# Patient Record
Sex: FEMALE | Race: WHITE | NOT HISPANIC OR LATINO
[De-identification: names, ages, dates, MRNs, and addresses within clinical notes are randomized per-mention and may not be internally consistent; named-entity substitution may affect disease eponyms.]

---

## 2018-11-26 ENCOUNTER — APPOINTMENT (OUTPATIENT)
Dept: INTERNAL MEDICINE | Facility: CLINIC | Age: 83
End: 2018-11-26
Payer: MEDICARE

## 2018-11-26 VITALS
WEIGHT: 162 LBS | HEIGHT: 64 IN | DIASTOLIC BLOOD PRESSURE: 72 MMHG | SYSTOLIC BLOOD PRESSURE: 138 MMHG | BODY MASS INDEX: 27.66 KG/M2

## 2018-11-26 DIAGNOSIS — Z86.79 PERSONAL HISTORY OF OTHER DISEASES OF THE CIRCULATORY SYSTEM: ICD-10-CM

## 2018-11-26 DIAGNOSIS — M85.80 OTHER SPECIFIED DISORDERS OF BONE DENSITY AND STRUCTURE, UNSPECIFIED SITE: ICD-10-CM

## 2018-11-26 DIAGNOSIS — Z78.9 OTHER SPECIFIED HEALTH STATUS: ICD-10-CM

## 2018-11-26 PROCEDURE — 99214 OFFICE O/P EST MOD 30 MIN: CPT

## 2018-11-27 PROBLEM — M85.80: Status: RESOLVED | Noted: 2018-11-26 | Resolved: 2018-11-27

## 2018-11-27 PROBLEM — Z78.9 NON-SMOKER: Status: ACTIVE | Noted: 2018-11-27

## 2018-11-27 NOTE — REVIEW OF SYSTEMS
[Lower Ext Edema] : lower extremity edema [Negative] : Gastrointestinal [Fever] : no fever [Fatigue] : no fatigue [Chest Pain] : no chest pain [Palpitations] : no palpitations [Orthopnea] : no orthopnea [FreeTextEntry9] : chest wall pain

## 2018-11-27 NOTE — HISTORY OF PRESENT ILLNESS
[Spouse] : spouse [FreeTextEntry1] : f/u visit [de-identified] : Patient here for follow up visit. Her  is present with her. Her bruising in right leg is resolving. Patient with c/o intermittent pain of the left chest wall  since her fall. No difficulty breathing, no chest pressure.

## 2018-11-27 NOTE — PHYSICAL EXAM
[No Acute Distress] : no acute distress [Well Nourished] : well nourished [Well Developed] : well developed [Well-Appearing] : well-appearing [No Respiratory Distress] : no respiratory distress  [Clear to Auscultation] : lungs were clear to auscultation bilaterally [No Accessory Muscle Use] : no accessory muscle use [de-identified] : s1s2, irreg [de-identified] : + RLE with mild edema and resolving bruise.  [de-identified] : + tenderness on palpation left chest wall, no swelling, redness or warmth [de-identified] : right knee with mild swelling,improved from prior visit

## 2018-12-04 ENCOUNTER — RX RENEWAL (OUTPATIENT)
Age: 83
End: 2018-12-04

## 2019-01-03 PROBLEM — Z00.00 ENCOUNTER FOR PREVENTIVE HEALTH EXAMINATION: Noted: 2019-01-03

## 2019-01-07 ENCOUNTER — APPOINTMENT (OUTPATIENT)
Dept: INTERNAL MEDICINE | Facility: CLINIC | Age: 84
End: 2019-01-07

## 2019-01-07 ENCOUNTER — APPOINTMENT (OUTPATIENT)
Dept: INTERNAL MEDICINE | Facility: CLINIC | Age: 84
End: 2019-01-07
Payer: MEDICARE

## 2019-01-07 VITALS
DIASTOLIC BLOOD PRESSURE: 78 MMHG | SYSTOLIC BLOOD PRESSURE: 120 MMHG | HEIGHT: 64 IN | BODY MASS INDEX: 28.68 KG/M2 | WEIGHT: 168 LBS

## 2019-01-07 DIAGNOSIS — Z86.79 PERSONAL HISTORY OF OTHER DISEASES OF THE CIRCULATORY SYSTEM: ICD-10-CM

## 2019-01-07 PROCEDURE — 99213 OFFICE O/P EST LOW 20 MIN: CPT

## 2019-01-07 RX ORDER — ALENDRONATE SODIUM 70 MG/1
70 TABLET ORAL WEEKLY
Qty: 12 | Refills: 3 | Status: DISCONTINUED | COMMUNITY
Start: 2018-12-04 | End: 2019-01-07

## 2019-01-07 NOTE — HISTORY OF PRESENT ILLNESS
[Spouse] : spouse [FreeTextEntry1] : follow up\par confusion with meds, wants to decrease the amount [de-identified] : Pt here today with he spouse. She would like to go over her med list. She thinks that she is taking too many meds and wants to see if she can discontinue any of the meds. \par She is feeling well otherwise. She was having intermittent diarrhea but since discontinuing fiber that has subsided.

## 2019-01-07 NOTE — PHYSICAL EXAM
[No Acute Distress] : no acute distress [Well Nourished] : well nourished [Well Developed] : well developed [Well-Appearing] : well-appearing [No Respiratory Distress] : no respiratory distress  [Clear to Auscultation] : lungs were clear to auscultation bilaterally [No Accessory Muscle Use] : no accessory muscle use [Normal Rate] : normal rate  [Regular Rhythm] : with a regular rhythm [Normal S1, S2] : normal S1 and S2 [Soft] : abdomen soft [No Murmur] : no murmur heard [No Edema] : there was no peripheral edema [Normal Gait] : normal gait [Normal Affect] : the affect was normal [Normal Insight/Judgement] : insight and judgment were intact

## 2019-01-10 ENCOUNTER — APPOINTMENT (OUTPATIENT)
Dept: OBGYN | Facility: CLINIC | Age: 84
End: 2019-01-10
Payer: COMMERCIAL

## 2019-01-23 DIAGNOSIS — Z80.0 FAMILY HISTORY OF MALIGNANT NEOPLASM OF DIGESTIVE ORGANS: ICD-10-CM

## 2019-01-23 DIAGNOSIS — Z78.9 OTHER SPECIFIED HEALTH STATUS: ICD-10-CM

## 2019-01-24 ENCOUNTER — APPOINTMENT (OUTPATIENT)
Dept: OBGYN | Facility: CLINIC | Age: 84
End: 2019-01-24
Payer: COMMERCIAL

## 2019-01-24 DIAGNOSIS — Z00.00 ENCOUNTER FOR GENERAL ADULT MEDICAL EXAMINATION W/OUT ABNORMAL FINDINGS: ICD-10-CM

## 2019-01-24 DIAGNOSIS — Z78.9 OTHER SPECIFIED HEALTH STATUS: ICD-10-CM

## 2019-01-24 PROCEDURE — G0101: CPT

## 2019-01-24 NOTE — PHYSICAL EXAM
[Awake] : awake [Alert] : alert [Acute Distress] : no acute distress [Mass] : no breast mass [Nipple Discharge] : no nipple discharge [Axillary LAD] : no axillary lymphadenopathy [Soft] : soft [Tender] : non tender [Oriented x3] : oriented to person, place, and time [Vulvar Atrophy] : vulvar atrophy [Normal] : vagina [Atrophy] : atrophy [No Bleeding] : there was no active vaginal bleeding [Absent] : absent [Uterine Adnexae] : were not tender and not enlarged [Ovarian Mass (___ Cm)] : there were no adnexal masses [Nl Sphincter Tone] : normal sphincter tone [FreeTextEntry9] : no masses

## 2019-01-28 ENCOUNTER — APPOINTMENT (OUTPATIENT)
Dept: CARDIOLOGY | Facility: CLINIC | Age: 84
End: 2019-01-28
Payer: MEDICARE

## 2019-01-28 VITALS
BODY MASS INDEX: 26.29 KG/M2 | HEIGHT: 64 IN | DIASTOLIC BLOOD PRESSURE: 80 MMHG | WEIGHT: 154 LBS | SYSTOLIC BLOOD PRESSURE: 125 MMHG | HEART RATE: 82 BPM

## 2019-01-28 VITALS
HEART RATE: 82 BPM | BODY MASS INDEX: 26.29 KG/M2 | HEIGHT: 64 IN | DIASTOLIC BLOOD PRESSURE: 80 MMHG | WEIGHT: 154 LBS | SYSTOLIC BLOOD PRESSURE: 125 MMHG

## 2019-01-28 PROCEDURE — 99212 OFFICE O/P EST SF 10 MIN: CPT | Mod: 25

## 2019-01-28 PROCEDURE — 93000 ELECTROCARDIOGRAM COMPLETE: CPT

## 2019-01-28 PROCEDURE — 93306 TTE W/DOPPLER COMPLETE: CPT

## 2019-01-28 RX ORDER — CALCIUM CITRATE/VITAMIN D3 200MG-6.25
250-200 TABLET ORAL
Refills: 0 | Status: DISCONTINUED | COMMUNITY
End: 2019-01-28

## 2019-01-28 RX ORDER — DONEPEZIL HYDROCHLORIDE 5 MG/1
5 TABLET, FILM COATED ORAL
Refills: 0 | Status: DISCONTINUED | COMMUNITY
End: 2019-01-28

## 2019-01-28 RX ORDER — CEPHALEXIN 500 MG/1
500 CAPSULE ORAL
Refills: 0 | Status: DISCONTINUED | COMMUNITY
End: 2019-01-28

## 2019-01-28 RX ORDER — METHYLCELLULOSE 500 MG/1
500 TABLET ORAL
Refills: 0 | Status: DISCONTINUED | COMMUNITY
End: 2019-01-28

## 2019-01-28 RX ORDER — ALENDRONATE SODIUM 70 MG/1
70 TABLET ORAL
Refills: 0 | Status: DISCONTINUED | COMMUNITY
End: 2019-01-28

## 2019-01-28 NOTE — PHYSICAL EXAM
[General Appearance - Well Developed] : well developed [Normal Appearance] : normal appearance [Well Groomed] : well groomed [General Appearance - Well Nourished] : well nourished [No Deformities] : no deformities [General Appearance - In No Acute Distress] : no acute distress [Normal Conjunctiva] : the conjunctiva exhibited no abnormalities [Eyelids - No Xanthelasma] : the eyelids demonstrated no xanthelasmas [Normal Oral Mucosa] : normal oral mucosa [No Oral Pallor] : no oral pallor [No Oral Cyanosis] : no oral cyanosis [Normal Jugular Venous A Waves Present] : normal jugular venous A waves present [Normal Jugular Venous V Waves Present] : normal jugular venous V waves present [No Jugular Venous Rowland A Waves] : no jugular venous rowland A waves [Respiration, Rhythm And Depth] : normal respiratory rhythm and effort [Exaggerated Use Of Accessory Muscles For Inspiration] : no accessory muscle use [Auscultation Breath Sounds / Voice Sounds] : lungs were clear to auscultation bilaterally [Heart Rate And Rhythm] : heart rate and rhythm were normal [Heart Sounds] : normal S1 and S2 [Murmurs] : no murmurs present [Abdomen Soft] : soft [Abdomen Tenderness] : non-tender [Abdomen Mass (___ Cm)] : no abdominal mass palpated [Abnormal Walk] : normal gait [Gait - Sufficient For Exercise Testing] : the gait was sufficient for exercise testing [Nail Clubbing] : no clubbing of the fingernails [Cyanosis, Localized] : no localized cyanosis [Petechial Hemorrhages (___cm)] : no petechial hemorrhages [Skin Color & Pigmentation] : normal skin color and pigmentation [] : no rash [No Venous Stasis] : no venous stasis [Skin Lesions] : no skin lesions [No Skin Ulcers] : no skin ulcer [No Xanthoma] : no  xanthoma was observed [Oriented To Time, Place, And Person] : oriented to person, place, and time [Affect] : the affect was normal [Mood] : the mood was normal [No Anxiety] : not feeling anxious

## 2019-01-29 NOTE — DISCUSSION/SUMMARY
[FreeTextEntry1] : The patient returns today for followup and echocardiography. She was followed with a diagnosis of chronic atrial fibrillation. Her clinical condition is excellent she describes no acute cardiac symptoms. Her been no symptoms of chest pain shortness of breath or palpitations. The examination is unremarkable the electrocardiogram reveals atrial fibrillation with controlled ventricular rate and pH with nonspecific ST-T wave abnormalities. Electrocardiogram reveals normal vigorous LV systolic function estimated EF 70%, dilated LA, dilated, mild aortic, trace MR.

## 2019-01-29 NOTE — REASON FOR VISIT
[FreeTextEntry1] : The patient returns to the office. She is followed with the principal diagnosis of atrial fibrillation.

## 2019-01-29 NOTE — HISTORY OF PRESENT ILLNESS
[FreeTextEntry1] : Patient denies any recent cardiac symptoms. She has had no symptoms of shortness of breath chest discomfort etc.

## 2019-02-13 ENCOUNTER — APPOINTMENT (OUTPATIENT)
Dept: INTERNAL MEDICINE | Facility: CLINIC | Age: 84
End: 2019-02-13
Payer: MEDICARE

## 2019-02-13 VITALS
HEIGHT: 64 IN | TEMPERATURE: 98.1 F | DIASTOLIC BLOOD PRESSURE: 68 MMHG | SYSTOLIC BLOOD PRESSURE: 112 MMHG | WEIGHT: 154 LBS | BODY MASS INDEX: 26.29 KG/M2

## 2019-02-13 DIAGNOSIS — F03.90 UNSPECIFIED DEMENTIA W/OUT BEHAVIORAL DISTURBANCE: ICD-10-CM

## 2019-02-13 DIAGNOSIS — M85.80 OTHER SPECIFIED DISORDERS OF BONE DENSITY AND STRUCTURE, UNSPECIFIED SITE: ICD-10-CM

## 2019-02-13 DIAGNOSIS — I48.1 PERSISTENT ATRIAL FIBRILLATION: ICD-10-CM

## 2019-02-13 PROCEDURE — 99213 OFFICE O/P EST LOW 20 MIN: CPT

## 2019-02-13 NOTE — HISTORY OF PRESENT ILLNESS
[Moderate] : moderate [___ Weeks ago] :  [unfilled] weeks ago [Constant] : constant [Congestion] : congestion [Cough] : cough [Stable] : stable [Sore Throat] : no sore throat [Wheezing] : no wheezing [Chills] : no chills [Shortness Of Breath] : no shortness of breath [Fatigue] : not fatigue [Headache] : no headache [Fever] : no fever [FreeTextEntry7] : chest congestion [FreeTextEntry5] : has not taken any meds [FreeTextEntry6] : not improving

## 2019-02-13 NOTE — PHYSICAL EXAM
[No Acute Distress] : no acute distress [Well Nourished] : well nourished [Well Developed] : well developed [Well-Appearing] : well-appearing [Normal Oropharynx] : the oropharynx was normal [Supple] : supple [No Lymphadenopathy] : no lymphadenopathy [No Respiratory Distress] : no respiratory distress  [No Accessory Muscle Use] : no accessory muscle use [Normal Rate] : normal rate  [Regular Rhythm] : with a regular rhythm [Normal S1, S2] : normal S1 and S2 [No Edema] : there was no peripheral edema [de-identified] : slight coarse sounds right upper lung field

## 2019-02-13 NOTE — REVIEW OF SYSTEMS
[Cough] : cough [Negative] : Gastrointestinal [Fever] : no fever [Sore Throat] : no sore throat [Shortness Of Breath] : no shortness of breath [Wheezing] : no wheezing

## 2019-02-17 ENCOUNTER — RX RENEWAL (OUTPATIENT)
Age: 84
End: 2019-02-17

## 2019-03-19 ENCOUNTER — RESULT REVIEW (OUTPATIENT)
Age: 84
End: 2019-03-19

## 2019-03-29 ENCOUNTER — RESULT REVIEW (OUTPATIENT)
Age: 84
End: 2019-03-29

## 2019-03-29 ENCOUNTER — CHART COPY (OUTPATIENT)
Age: 84
End: 2019-03-29

## 2019-04-08 ENCOUNTER — APPOINTMENT (OUTPATIENT)
Dept: INTERNAL MEDICINE | Facility: CLINIC | Age: 84
End: 2019-04-08
Payer: MEDICARE

## 2019-04-08 VITALS
BODY MASS INDEX: 26.12 KG/M2 | DIASTOLIC BLOOD PRESSURE: 80 MMHG | WEIGHT: 153 LBS | HEIGHT: 64 IN | SYSTOLIC BLOOD PRESSURE: 134 MMHG

## 2019-04-08 PROCEDURE — 99214 OFFICE O/P EST MOD 30 MIN: CPT | Mod: 25

## 2019-04-08 PROCEDURE — 36415 COLL VENOUS BLD VENIPUNCTURE: CPT

## 2019-04-08 RX ORDER — ASCORBIC ACID 500 MG
500 TABLET ORAL DAILY
Refills: 0 | Status: DISCONTINUED | COMMUNITY
End: 2019-04-08

## 2019-04-08 RX ORDER — BENZONATATE 100 MG/1
100 CAPSULE ORAL 3 TIMES DAILY
Qty: 21 | Refills: 0 | Status: DISCONTINUED | COMMUNITY
Start: 2019-02-13 | End: 2019-04-08

## 2019-04-08 RX ORDER — AZITHROMYCIN 250 MG/1
250 TABLET, FILM COATED ORAL DAILY
Qty: 1 | Refills: 0 | Status: DISCONTINUED | COMMUNITY
Start: 2019-02-13 | End: 2019-04-08

## 2019-04-08 RX ORDER — APIXABAN 5 MG/1
5 TABLET, FILM COATED ORAL
Refills: 0 | Status: DISCONTINUED | COMMUNITY
End: 2019-04-08

## 2019-04-08 NOTE — PHYSICAL EXAM
[No Acute Distress] : no acute distress [Well Nourished] : well nourished [Well Developed] : well developed [Well-Appearing] : well-appearing [No Respiratory Distress] : no respiratory distress  [Clear to Auscultation] : lungs were clear to auscultation bilaterally [No Accessory Muscle Use] : no accessory muscle use [Normal Rate] : normal rate  [Soft] : abdomen soft [Normal Gait] : normal gait [No Focal Deficits] : no focal deficits [Speech Grossly Normal] : speech grossly normal [Memory Grossly Normal] : memory grossly normal [Normal Affect] : the affect was normal [Alert and Oriented x3] : oriented to person, place, and time [Normal Mood] : the mood was normal [Normal Insight/Judgement] : insight and judgment were intact [Pedal Pulses Present] : the pedal pulses are present [de-identified] : irreg at present [de-identified] : left foot swollen, non pitting up to the level of ankle [de-identified] : sensory on feet intact

## 2019-04-08 NOTE — HEALTH RISK ASSESSMENT
[No falls in past year] : Patient reported no falls in the past year [0] : 2) Feeling down, depressed, or hopeless: Not at all (0) [] : No [de-identified] : none [de-identified] : none

## 2019-04-08 NOTE — REVIEW OF SYSTEMS
[Lower Ext Edema] : lower extremity edema [Negative] : Heme/Lymph [FreeTextEntry5] : left ankle swelling [FreeTextEntry9] : foot swelling left greater than right [de-identified] : numbness in feet intermittently

## 2019-04-08 NOTE — HISTORY OF PRESENT ILLNESS
[Spouse] : spouse [FreeTextEntry1] : swollen and numbness in feet [de-identified] : Pt here for f/u visit. She gets some swelling in her feet. She is unsure if it comes more on evenings. She has been told to wear compression socks but does not.  Her left foot is more swollen than right. She thinks this has been for a while. She also describes a feeling of numbness in both feet. She has not fallen. Does not have pain. She is able to walk without issue. She does not gets much exercise, though she has been doing chair yoga.

## 2019-04-09 LAB
ALBUMIN SERPL ELPH-MCNC: 4.2 G/DL
ALP BLD-CCNC: 72 U/L
ALT SERPL-CCNC: 15 U/L
ANION GAP SERPL CALC-SCNC: 15 MMOL/L
AST SERPL-CCNC: 21 U/L
BILIRUB SERPL-MCNC: 1.6 MG/DL
BUN SERPL-MCNC: 18 MG/DL
CALCIUM SERPL-MCNC: 9.9 MG/DL
CHLORIDE SERPL-SCNC: 103 MMOL/L
CHOLEST SERPL-MCNC: 154 MG/DL
CHOLEST/HDLC SERPL: 3.7 RATIO
CO2 SERPL-SCNC: 25 MMOL/L
CREAT SERPL-MCNC: 0.95 MG/DL
GLUCOSE SERPL-MCNC: 87 MG/DL
HDLC SERPL-MCNC: 42 MG/DL
LDLC SERPL CALC-MCNC: 73 MG/DL
POTASSIUM SERPL-SCNC: 4 MMOL/L
PROT SERPL-MCNC: 6.6 G/DL
SODIUM SERPL-SCNC: 143 MMOL/L
TRIGL SERPL-MCNC: 193 MG/DL

## 2019-04-11 ENCOUNTER — RX RENEWAL (OUTPATIENT)
Age: 84
End: 2019-04-11

## 2019-04-12 ENCOUNTER — RX RENEWAL (OUTPATIENT)
Age: 84
End: 2019-04-12

## 2019-04-15 ENCOUNTER — APPOINTMENT (OUTPATIENT)
Dept: OBGYN | Facility: CLINIC | Age: 84
End: 2019-04-15

## 2019-05-06 ENCOUNTER — RX RENEWAL (OUTPATIENT)
Age: 84
End: 2019-05-06

## 2019-05-10 ENCOUNTER — RX RENEWAL (OUTPATIENT)
Age: 84
End: 2019-05-10

## 2019-05-23 ENCOUNTER — RX RENEWAL (OUTPATIENT)
Age: 84
End: 2019-05-23

## 2019-07-12 ENCOUNTER — RX RENEWAL (OUTPATIENT)
Age: 84
End: 2019-07-12

## 2019-07-26 ENCOUNTER — APPOINTMENT (OUTPATIENT)
Dept: INTERNAL MEDICINE | Facility: CLINIC | Age: 84
End: 2019-07-26

## 2019-07-29 ENCOUNTER — APPOINTMENT (OUTPATIENT)
Dept: CARDIOLOGY | Facility: CLINIC | Age: 84
End: 2019-07-29
Payer: MEDICARE

## 2019-07-29 VITALS
WEIGHT: 156 LBS | HEART RATE: 85 BPM | BODY MASS INDEX: 26.63 KG/M2 | HEIGHT: 64 IN | SYSTOLIC BLOOD PRESSURE: 135 MMHG | DIASTOLIC BLOOD PRESSURE: 80 MMHG

## 2019-07-29 PROCEDURE — 93000 ELECTROCARDIOGRAM COMPLETE: CPT

## 2019-07-29 PROCEDURE — 99213 OFFICE O/P EST LOW 20 MIN: CPT

## 2019-07-29 NOTE — PHYSICAL EXAM
[General Appearance - Well Developed] : well developed [Normal Appearance] : normal appearance [General Appearance - Well Nourished] : well nourished [Well Groomed] : well groomed [No Deformities] : no deformities [General Appearance - In No Acute Distress] : no acute distress [Normal Oral Mucosa] : normal oral mucosa [Normal Conjunctiva] : the conjunctiva exhibited no abnormalities [Eyelids - No Xanthelasma] : the eyelids demonstrated no xanthelasmas [No Oral Cyanosis] : no oral cyanosis [No Oral Pallor] : no oral pallor [Normal Jugular Venous A Waves Present] : normal jugular venous A waves present [No Jugular Venous Rowland A Waves] : no jugular venous rowland A waves [Normal Jugular Venous V Waves Present] : normal jugular venous V waves present [Respiration, Rhythm And Depth] : normal respiratory rhythm and effort [Auscultation Breath Sounds / Voice Sounds] : lungs were clear to auscultation bilaterally [Exaggerated Use Of Accessory Muscles For Inspiration] : no accessory muscle use [Heart Rate And Rhythm] : heart rate and rhythm were normal [Murmurs] : no murmurs present [Heart Sounds] : normal S1 and S2 [Abdomen Tenderness] : non-tender [Abdomen Soft] : soft [Abnormal Walk] : normal gait [Abdomen Mass (___ Cm)] : no abdominal mass palpated [Cyanosis, Localized] : no localized cyanosis [Gait - Sufficient For Exercise Testing] : the gait was sufficient for exercise testing [Nail Clubbing] : no clubbing of the fingernails [Petechial Hemorrhages (___cm)] : no petechial hemorrhages [] : no rash [Skin Color & Pigmentation] : normal skin color and pigmentation [No Venous Stasis] : no venous stasis [No Skin Ulcers] : no skin ulcer [Skin Lesions] : no skin lesions [No Xanthoma] : no  xanthoma was observed [Affect] : the affect was normal [Oriented To Time, Place, And Person] : oriented to person, place, and time [Mood] : the mood was normal [No Anxiety] : not feeling anxious

## 2019-07-29 NOTE — DISCUSSION/SUMMARY
[FreeTextEntry1] : There is today's examination the patient's cardiovascular status is stable the electrocardiogram reveals atrial fibrillation with controlled ventricular response. Nonspecific ST-T wave abnormalities. Current medications will be continued ;we will perform a repeat cardiogram in 6 months.

## 2019-07-29 NOTE — REASON FOR VISIT
[FreeTextEntry1] : The patient is followed with the principal diagnosis of chronic atrial fibrillation. She is doing very well clinically. There have been no symptoms of chest pain shortness of breath dizziness lightheadedness or palpitations. The patient remains active with walking et cetera.

## 2019-08-02 ENCOUNTER — APPOINTMENT (OUTPATIENT)
Dept: INTERNAL MEDICINE | Facility: CLINIC | Age: 84
End: 2019-08-02
Payer: MEDICARE

## 2019-08-02 VITALS
HEIGHT: 66 IN | WEIGHT: 156 LBS | BODY MASS INDEX: 25.07 KG/M2 | SYSTOLIC BLOOD PRESSURE: 122 MMHG | DIASTOLIC BLOOD PRESSURE: 78 MMHG

## 2019-08-02 PROCEDURE — G0009: CPT

## 2019-08-02 PROCEDURE — 99213 OFFICE O/P EST LOW 20 MIN: CPT | Mod: 25

## 2019-08-02 PROCEDURE — 90670 PCV13 VACCINE IM: CPT

## 2019-08-02 RX ORDER — PREDNISOLONE ACETATE 10 MG/ML
1 SUSPENSION/ DROPS OPHTHALMIC
Qty: 5 | Refills: 0 | Status: DISCONTINUED | COMMUNITY
Start: 2018-11-08 | End: 2019-08-02

## 2019-08-02 RX ORDER — MEMANTINE HYDROCHLORIDE 7 MG/1
7 CAPSULE, EXTENDED RELEASE ORAL
Qty: 30 | Refills: 0 | Status: DISCONTINUED | COMMUNITY
Start: 2019-02-11 | End: 2019-08-02

## 2019-08-02 RX ORDER — FOLIC ACID/MULTIVIT,IRON,MINER 0.4MG-18MG
400 TABLET,CHEWABLE ORAL DAILY
Refills: 0 | Status: DISCONTINUED | COMMUNITY
End: 2019-08-02

## 2019-08-02 RX ORDER — MULTIVITAMIN
TABLET ORAL
Refills: 0 | Status: DISCONTINUED | COMMUNITY
End: 2019-08-02

## 2019-08-02 NOTE — HISTORY OF PRESENT ILLNESS
[FreeTextEntry1] : swollen feet [de-identified] : Pt with swollen feet for months now. She has no pain. Her left foot is more swollen than the right foot, non pitting edema and it stops at the ankle. She has compression socks but does not wear them. She does have bunions, worse on left foot than right but they do not bother her. No redness or warmth. \par Pt with no other complaints at present.

## 2019-08-02 NOTE — HEALTH RISK ASSESSMENT
[No falls in past year] : Patient reported no falls in the past year [No] : In the past 12 months have you used drugs other than those required for medical reasons? 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

## 2019-08-02 NOTE — PHYSICAL EXAM
[No Acute Distress] : no acute distress [Well Nourished] : well nourished [Well Developed] : well developed [Well-Appearing] : well-appearing [No Respiratory Distress] : no respiratory distress  [No Accessory Muscle Use] : no accessory muscle use [Clear to Auscultation] : lungs were clear to auscultation bilaterally [Normal S1, S2] : normal S1 and S2 [Normal Rate] : normal rate  [Pedal Pulses Present] : the pedal pulses are present [No Murmur] : no murmur heard [Normal Gait] : normal gait [Soft] : abdomen soft [Coordination Grossly Intact] : coordination grossly intact [No Focal Deficits] : no focal deficits [Speech Grossly Normal] : speech grossly normal [Normal Affect] : the affect was normal [Memory Grossly Normal] : memory grossly normal [Alert and Oriented x3] : oriented to person, place, and time [Normal Mood] : the mood was normal [Normal Insight/Judgement] : insight and judgment were intact [de-identified] : irreg at present [de-identified] : left foot swollen, non pitting up to the level of ankle [de-identified] : sensory on feet intact

## 2019-09-10 ENCOUNTER — RX RENEWAL (OUTPATIENT)
Age: 84
End: 2019-09-10

## 2019-11-04 ENCOUNTER — RX RENEWAL (OUTPATIENT)
Age: 84
End: 2019-11-04

## 2019-12-09 ENCOUNTER — RX RENEWAL (OUTPATIENT)
Age: 84
End: 2019-12-09

## 2019-12-31 ENCOUNTER — RX RENEWAL (OUTPATIENT)
Age: 84
End: 2019-12-31

## 2020-01-28 ENCOUNTER — APPOINTMENT (OUTPATIENT)
Dept: CARDIOLOGY | Facility: CLINIC | Age: 85
End: 2020-01-28
Payer: MEDICARE

## 2020-01-28 ENCOUNTER — NON-APPOINTMENT (OUTPATIENT)
Age: 85
End: 2020-01-28

## 2020-01-28 VITALS
BODY MASS INDEX: 25.07 KG/M2 | SYSTOLIC BLOOD PRESSURE: 144 MMHG | HEART RATE: 81 BPM | HEIGHT: 66 IN | DIASTOLIC BLOOD PRESSURE: 90 MMHG | WEIGHT: 156 LBS

## 2020-01-28 PROCEDURE — 99213 OFFICE O/P EST LOW 20 MIN: CPT

## 2020-01-28 PROCEDURE — 93306 TTE W/DOPPLER COMPLETE: CPT

## 2020-01-28 PROCEDURE — 93000 ELECTROCARDIOGRAM COMPLETE: CPT

## 2020-01-28 RX ORDER — DIGOXIN 125 UG/1
125 TABLET ORAL DAILY
Qty: 90 | Refills: 1 | Status: DISCONTINUED | COMMUNITY
End: 2020-01-28

## 2020-01-28 NOTE — PHYSICAL EXAM
[General Appearance - Well Developed] : well developed [General Appearance - Well Nourished] : well nourished [Well Groomed] : well groomed [Normal Appearance] : normal appearance [General Appearance - In No Acute Distress] : no acute distress [No Deformities] : no deformities [Normal Conjunctiva] : the conjunctiva exhibited no abnormalities [Normal Oral Mucosa] : normal oral mucosa [Eyelids - No Xanthelasma] : the eyelids demonstrated no xanthelasmas [No Oral Cyanosis] : no oral cyanosis [No Oral Pallor] : no oral pallor [Normal Jugular Venous A Waves Present] : normal jugular venous A waves present [Normal Jugular Venous V Waves Present] : normal jugular venous V waves present [No Jugular Venous Rowland A Waves] : no jugular venous rowland A waves [Respiration, Rhythm And Depth] : normal respiratory rhythm and effort [Exaggerated Use Of Accessory Muscles For Inspiration] : no accessory muscle use [Auscultation Breath Sounds / Voice Sounds] : lungs were clear to auscultation bilaterally [Heart Rate And Rhythm] : heart rate and rhythm were normal [Heart Sounds] : normal S1 and S2 [Abdomen Soft] : soft [Abdomen Tenderness] : non-tender [Abdomen Mass (___ Cm)] : no abdominal mass palpated [Abnormal Walk] : normal gait [Nail Clubbing] : no clubbing of the fingernails [Cyanosis, Localized] : no localized cyanosis [Gait - Sufficient For Exercise Testing] : the gait was sufficient for exercise testing [Petechial Hemorrhages (___cm)] : no petechial hemorrhages [Skin Color & Pigmentation] : normal skin color and pigmentation [] : no rash [No Venous Stasis] : no venous stasis [Skin Lesions] : no skin lesions [No Skin Ulcers] : no skin ulcer [No Xanthoma] : no  xanthoma was observed [Affect] : the affect was normal [Mood] : the mood was normal [Oriented To Time, Place, And Person] : oriented to person, place, and time [No Anxiety] : not feeling anxious [FreeTextEntry1] : grade i sytolic m at apex.

## 2020-01-28 NOTE — DISCUSSION/SUMMARY
[FreeTextEntry1] : The patient's clinical condition is completely stable. Today's echocardiogram reveals normal LV systolic function estimated EF 65-70%, dilated LA, dilated RA, mild to moderate mitral regurgitation, moderate to severe tricuspid regurgitation, mild-to-moderate aortic regurgitation. The PA pressure was elevated. However the patient is already receiving a diuretic and tends to urinate quite copiously. The patient will keep you advised as to whether there is any evidence of fluid retention in which case a switch over to furosemide may be needed.

## 2020-01-28 NOTE — REASON FOR VISIT
[FreeTextEntry1] : Patient's clinical condition has been entirely stable. There have been no symptoms of chest discomfort or shortness of breath. There has been no evidence of significant fluid retention.

## 2020-02-11 ENCOUNTER — RX RENEWAL (OUTPATIENT)
Age: 85
End: 2020-02-11

## 2020-03-09 ENCOUNTER — APPOINTMENT (OUTPATIENT)
Dept: INTERNAL MEDICINE | Facility: CLINIC | Age: 85
End: 2020-03-09
Payer: MEDICARE

## 2020-03-09 VITALS
BODY MASS INDEX: 24.91 KG/M2 | DIASTOLIC BLOOD PRESSURE: 72 MMHG | SYSTOLIC BLOOD PRESSURE: 124 MMHG | WEIGHT: 155 LBS | HEIGHT: 66 IN

## 2020-03-09 DIAGNOSIS — Z87.09 PERSONAL HISTORY OF OTHER DISEASES OF THE RESPIRATORY SYSTEM: ICD-10-CM

## 2020-03-09 DIAGNOSIS — Z87.898 PERSONAL HISTORY OF OTHER SPECIFIED CONDITIONS: ICD-10-CM

## 2020-03-09 DIAGNOSIS — M79.89 OTHER SPECIFIED SOFT TISSUE DISORDERS: ICD-10-CM

## 2020-03-09 DIAGNOSIS — N63.0 UNSPECIFIED LUMP IN UNSPECIFIED BREAST: ICD-10-CM

## 2020-03-09 DIAGNOSIS — G47.10 HYPERSOMNIA, UNSPECIFIED: ICD-10-CM

## 2020-03-09 PROCEDURE — 99213 OFFICE O/P EST LOW 20 MIN: CPT

## 2020-03-09 NOTE — HEALTH RISK ASSESSMENT
[No] : In the past 12 months have you used drugs other than those required for medical reasons? No [No falls in past year] : Patient reported no falls in the past year [0] : 2) Feeling down, depressed, or hopeless: Not at all (0) [Patient reported bone density results were abnormal] : Patient reported bone density results were abnormal [None] : None [With Significant Other] : lives with significant other [Retired] : retired [] :  [Feels Safe at Home] : Feels safe at home [Fully functional (bathing, dressing, toileting, transferring, walking, feeding)] : Fully functional (bathing, dressing, toileting, transferring, walking, feeding) [Fully functional (using the telephone, shopping, preparing meals, housekeeping, doing laundry, using] : Fully functional and needs no help or supervision to perform IADLs (using the telephone, shopping, preparing meals, housekeeping, doing laundry, using transportation, managing medications and managing finances) [MammogramDate] : 03/19 [BoneDensityDate] : 03/19 [] : No [de-identified] : none [de-identified] : none

## 2020-03-09 NOTE — REVIEW OF SYSTEMS
[Lower Ext Edema] : lower extremity edema [Fever] : no fever [Chest Pain] : no chest pain [Shortness Of Breath] : no shortness of breath [Wheezing] : no wheezing [Cough] : no cough [Joint Pain] : no joint pain

## 2020-03-09 NOTE — HISTORY OF PRESENT ILLNESS
[FreeTextEntry8] : swollen left leg for months now. When she wakes up in the morning the leg is still swollen. Not painful. She was using what she thought were compression stocking but they aren't. Her right leg is not swollen.  Looking back to prior notes she has had this swelling since at least April of last year. She does not elevate leg during the day. No long drives. She flew from [place] last week. Did not walk around plane but it was only a short flight.  She is also on Eliquis.No SOB. No chest pains

## 2020-03-09 NOTE — PHYSICAL EXAM
[No Lymphadenopathy] : no lymphadenopathy [Supple] : supple [Normal] : no respiratory distress, lungs were clear to auscultation bilaterally and no accessory muscle use [Normal Rate] : normal rate  [Normal S1, S2] : normal S1 and S2 [No Murmur] : no murmur heard [No Focal Deficits] : no focal deficits [Normal Gait] : normal gait [Normal Affect] : the affect was normal [Alert and Oriented x3] : oriented to person, place, and time [Normal Mood] : the mood was normal [Normal Insight/Judgement] : insight and judgment were intact [de-identified] : irreg

## 2020-03-10 ENCOUNTER — RESULT REVIEW (OUTPATIENT)
Age: 85
End: 2020-03-10

## 2020-03-23 ENCOUNTER — RX RENEWAL (OUTPATIENT)
Age: 85
End: 2020-03-23

## 2020-04-16 ENCOUNTER — APPOINTMENT (OUTPATIENT)
Dept: INTERNAL MEDICINE | Facility: CLINIC | Age: 85
End: 2020-04-16
Payer: MEDICARE

## 2020-04-16 ENCOUNTER — APPOINTMENT (OUTPATIENT)
Dept: PODIATRY | Facility: CLINIC | Age: 85
End: 2020-04-16
Payer: MEDICARE

## 2020-04-16 VITALS
SYSTOLIC BLOOD PRESSURE: 146 MMHG | DIASTOLIC BLOOD PRESSURE: 72 MMHG | HEIGHT: 66 IN | WEIGHT: 158 LBS | BODY MASS INDEX: 25.39 KG/M2

## 2020-04-16 VITALS
BODY MASS INDEX: 25.39 KG/M2 | DIASTOLIC BLOOD PRESSURE: 72 MMHG | HEIGHT: 66 IN | WEIGHT: 158 LBS | TEMPERATURE: 97.8 F | SYSTOLIC BLOOD PRESSURE: 146 MMHG

## 2020-04-16 PROCEDURE — 99213 OFFICE O/P EST LOW 20 MIN: CPT

## 2020-04-16 PROCEDURE — 11042 DBRDMT SUBQ TIS 1ST 20SQCM/<: CPT

## 2020-04-16 PROCEDURE — 99203 OFFICE O/P NEW LOW 30 MIN: CPT | Mod: 25

## 2020-04-16 NOTE — REVIEW OF SYSTEMS
[As Noted in HPI] : as noted in HPI [Skin Wound] : skin wound [Negative] : Endocrine [Fever] : no fever [Chills] : no chills [Leg Claudication] : no intermittent leg claudication [Lower Ext Edema] : no lower extremity edema [FreeTextEntry5] : but pedal edema to left foot which patient states is normal

## 2020-04-16 NOTE — HEALTH RISK ASSESSMENT
[No] : In the past 12 months have you used drugs other than those required for medical reasons? No [No falls in past year] : Patient reported no falls in the past year [Assistive Device] : Patient uses an assistive device [0] : 2) Feeling down, depressed, or hopeless: Not at all (0) [] : No [de-identified] : none [de-identified] : none [de-identified] : cane

## 2020-04-16 NOTE — PHYSICAL EXAM
[Normal] : no respiratory distress, lungs were clear to auscultation bilaterally and no accessory muscle use [Normal Rate] : normal rate  [Normal Affect] : the affect was normal [Normal Insight/Judgement] : insight and judgment were intact [de-identified] : irreg [de-identified] : LLE edema with ulcer in left instep, not very tender to palpation, swelling around ulcer as well.  [de-identified] : AAO x 2

## 2020-04-16 NOTE — PHYSICAL EXAM
[General Appearance - Alert] : alert [General Appearance - In No Acute Distress] : in no acute distress [Full Pulse] : the pedal pulses are present [Veins - Varicosity Changes] : there were no varicosital changes [Abnormal Walk] : normal gait [Musculoskeletal - Swelling] : no joint swelling seen [Motor Tone] : muscle strength and tone were normal [Sensation] : the sensory exam was normal to light touch and pinprick [Motor Exam] : the motor exam was normal [Oriented To Time, Place, And Person] : oriented to person, place, and time [Affect] : the affect was normal [Mood] : the mood was normal [FreeTextEntry1] : ulcer noted to medial aspect of the left midfoot, 2cm by 1.5cm by 0.2cm 80% GT and 20% fibrin. There is significant HK borders but no tunneling or sinus tract. No foul odor, nu drainage, no cellulitis

## 2020-04-16 NOTE — HISTORY OF PRESENT ILLNESS
[FreeTextEntry1] : Location: medial aspect of left foot\par Duration: a few weeks\par Acute:yes\par Past Tx:seen by her son EMT, who referred her to her PCP and subsequent notified by Dr Chamberlain who requested urgent visit\par Exacerbated by: unknown\par Patient a poor historian\par Prior Hx: no\par

## 2020-04-16 NOTE — PROCEDURE
[FreeTextEntry1] : had a lengthy discussion with the patient regarding the diagnosis etiology and differential diagnosis as well as treatment options for the presenting problem. Risks alternatives and benefits of treatment ranging from conservative to surgical explained in great detail. \par verbal consent for debridement in front of MA\par a sharp full thickness excisional debridement utilizing a scalpel tissue nipper as well as a curette into the subcutaneous tissue level was performed. Bleeding was mild and was controlled with pressure. , discharge orders as well as wound care orders were reviewed with the patient and a follow up appointment given, Bactroban to be applied on a daily basis\par Tissue for Cx obtained\par Sx shoe disp[ensed\par off loading reviewed\par follow up appt 1 week\par

## 2020-04-16 NOTE — REASON FOR VISIT
[Initial Evaluation] : an initial evaluation [FreeTextEntry1] : left foot-Referred by Dr Chamberlain

## 2020-04-16 NOTE — HISTORY OF PRESENT ILLNESS
[FreeTextEntry8] : blister on left foot \par Pt with foot lesion on foot for past week, not improving. No fever or chills. She states it is not getting better with bacitracin. She has no pain in the region, but once in a while gets a twinge. States initially there was a blister there. She says there was no manipulation of it.

## 2020-04-23 ENCOUNTER — APPOINTMENT (OUTPATIENT)
Dept: PODIATRY | Facility: CLINIC | Age: 85
End: 2020-04-23
Payer: MEDICARE

## 2020-04-23 VITALS
WEIGHT: 158 LBS | HEIGHT: 66 IN | DIASTOLIC BLOOD PRESSURE: 72 MMHG | BODY MASS INDEX: 25.39 KG/M2 | SYSTOLIC BLOOD PRESSURE: 146 MMHG

## 2020-04-23 PROCEDURE — 99213 OFFICE O/P EST LOW 20 MIN: CPT

## 2020-04-23 NOTE — HISTORY OF PRESENT ILLNESS
[FreeTextEntry1] : Location: medial aspect of left foot\par Duration: 2-3 weeks\par Acute:yes\par Past Tx:seen by her son EMT, who referred her to her PCP and subsequent notified by Dr Chamberlain who requested urgent visit\par On Keflex (hasn’t been taking correct dosage "i forget sometimes"\par \par

## 2020-04-23 NOTE — REASON FOR VISIT
[Initial Evaluation] : an initial evaluation [FreeTextEntry1] : left foot ulcer, new c/o skin tear from aggressively pulling off tape plantar aspect of the left foot

## 2020-04-23 NOTE — PROCEDURE
[FreeTextEntry1] : I have reviewed the care orders with the patient they understand and they also understands the long-term consequences of not following my wound care orders as well  precautions I have recommended  and shoe gear advice I have mentioned.\par bactroban to both areas daily, dispensed 2 x 2 's and rosy. Patient advised to not apply tape to her skin, only the rosy.\par finish oral abx\par \par follow up appt 1 week\par

## 2020-04-23 NOTE — REVIEW OF SYSTEMS
[As Noted in HPI] : as noted in HPI [Skin Wound] : skin wound [Negative] : Neurological [Fever] : no fever [Chills] : no chills [Leg Claudication] : no intermittent leg claudication [Lower Ext Edema] : no lower extremity edema [FreeTextEntry5] : but pedal edema to left foot which patient states is normal

## 2020-04-23 NOTE — PHYSICAL EXAM
[Full Pulse] : the pedal pulses are present [Abnormal Walk] : normal gait [Veins - Varicosity Changes] : there were no varicosital changes [Musculoskeletal - Swelling] : no joint swelling seen [Motor Tone] : muscle strength and tone were normal [FreeTextEntry1] : ulcer noted to medial aspect of the left midfoot, 1cm by 1.cm by 0.1cm  100% GT . There is  HK borders but less than last week no tunneling or sinus tract. No foul odor, nu drainage, no cellulitis. Plantar aspect of the let foot with skin tear--superficial and not infected

## 2020-04-28 LAB — BACTERIA TISS CULT: ABNORMAL

## 2020-05-04 ENCOUNTER — APPOINTMENT (OUTPATIENT)
Dept: PODIATRY | Facility: CLINIC | Age: 85
End: 2020-05-04
Payer: MEDICARE

## 2020-05-04 VITALS
WEIGHT: 158 LBS | DIASTOLIC BLOOD PRESSURE: 70 MMHG | SYSTOLIC BLOOD PRESSURE: 146 MMHG | HEIGHT: 66 IN | BODY MASS INDEX: 25.39 KG/M2

## 2020-05-04 PROCEDURE — 99213 OFFICE O/P EST LOW 20 MIN: CPT

## 2020-05-04 RX ORDER — CEPHALEXIN 500 MG/1
500 CAPSULE ORAL 4 TIMES DAILY
Qty: 28 | Refills: 0 | Status: DISCONTINUED | COMMUNITY
Start: 2020-04-16 | End: 2020-05-04

## 2020-05-04 NOTE — REVIEW OF SYSTEMS
[As Noted in HPI] : as noted in HPI [Negative] : Musculoskeletal [Chills] : no chills [Fever] : no fever [Lower Ext Edema] : no lower extremity edema [Leg Claudication] : no intermittent leg claudication [FreeTextEntry5] : less forefoot edema noted

## 2020-05-04 NOTE — REASON FOR VISIT
[Initial Evaluation] : an initial evaluation [Spouse] : spouse [FreeTextEntry1] : left foot ulcer, and recent  skin tear  left foot

## 2020-05-04 NOTE — PROCEDURE
[FreeTextEntry1] : I have reviewed the care orders with the patient they understand and they also understands the long-term consequences of not following my wound care orders as well  precautions I have recommended  and shoe gear advice I have mentioned.\par betadine daily for 1 week\par follow up prn\par \par

## 2020-05-04 NOTE — PHYSICAL EXAM
[Veins - Varicosity Changes] : there were no varicosital changes [Full Pulse] : the pedal pulses are present [Abnormal Walk] : normal gait [Musculoskeletal - Swelling] : no joint swelling seen [Motor Tone] : muscle strength and tone were normal [General Appearance - In No Acute Distress] : in no acute distress [General Appearance - Alert] : alert [FreeTextEntry1] : ulcer noted to medial aspect of the left midfoot, not with firm scab, mild surrounding erythema but no cellulitis, no tunneling, no sinus tract, no drainage, no odor, mild periwound erythema, no evidence of infection

## 2020-05-07 ENCOUNTER — RX RENEWAL (OUTPATIENT)
Age: 85
End: 2020-05-07

## 2020-05-27 ENCOUNTER — RESULT REVIEW (OUTPATIENT)
Age: 85
End: 2020-05-27

## 2020-06-02 ENCOUNTER — APPOINTMENT (OUTPATIENT)
Dept: OBGYN | Facility: CLINIC | Age: 85
End: 2020-06-02
Payer: MEDICARE

## 2020-06-02 PROCEDURE — 99214 OFFICE O/P EST MOD 30 MIN: CPT

## 2020-06-07 ENCOUNTER — RX RENEWAL (OUTPATIENT)
Age: 85
End: 2020-06-07

## 2020-07-02 ENCOUNTER — APPOINTMENT (OUTPATIENT)
Dept: PODIATRY | Facility: CLINIC | Age: 85
End: 2020-07-02
Payer: MEDICARE

## 2020-07-02 VITALS
DIASTOLIC BLOOD PRESSURE: 80 MMHG | SYSTOLIC BLOOD PRESSURE: 128 MMHG | WEIGHT: 158 LBS | BODY MASS INDEX: 25.39 KG/M2 | HEIGHT: 66 IN

## 2020-07-02 PROCEDURE — 99213 OFFICE O/P EST LOW 20 MIN: CPT

## 2020-07-02 NOTE — REASON FOR VISIT
[Initial Evaluation] : an initial evaluation [Spouse] : spouse [FreeTextEntry1] : left foot ulcer, mild numbness and tingling of plantar foot bilateral

## 2020-07-02 NOTE — HISTORY OF PRESENT ILLNESS
[FreeTextEntry1] : Location: medial aspect of left foot healed ulcer\par Location-plantarly both\par Duration-"quite some time". \par Past tx-nothing\par chronic in nature\par \par \par \par \par

## 2020-07-02 NOTE — REVIEW OF SYSTEMS
[As Noted in HPI] : as noted in HPI [Negative] : Musculoskeletal [Skin Lesions] : no skin lesions [Skin Wound] : no skin wound

## 2020-07-02 NOTE — PROCEDURE
[FreeTextEntry1] : Based on my physical examination and my clinical findings and the patient's description of the symptoms, a complete differential diagnosis was reviewed with the patient. Possible diagnoses as well as treatment options explained in great detail. All questions asked and answered appropriately.\par I had a lengthy d/w the patient re: the use and benefits of various vitamins as a dietary supplement which can help treat their current condition. Educational literature supplied and all questions asked and answered appropriately. Suggestions of the type of vitamins recommended dispensed and advised to use on a consistent basis\par If sx persisit advised f/u with PCP and/or..neurology consult\par \par

## 2020-07-02 NOTE — PHYSICAL EXAM
[General Appearance - Alert] : alert [General Appearance - In No Acute Distress] : in no acute distress [Full Pulse] : the pedal pulses are present [Veins - Varicosity Changes] : there were no varicosital changes [Abnormal Walk] : normal gait [Musculoskeletal - Swelling] : no joint swelling seen [Motor Tone] : muscle strength and tone were normal [Deep Tendon Reflexes (DTR)] : deep tendon reflexes were 2+ and symmetric [Sensation] : the sensory exam was normal to light touch and pinprick [Motor Exam] : the motor exam was normal [Oriented To Time, Place, And Person] : oriented to person, place, and time [FreeTextEntry1] : Dermatologic exam reveals no significant findings. No sign of subcutaneous nodules skin lesions or ulcers. Webspaces are clear there are no sign of infection cellulitis or erythema.

## 2020-07-30 ENCOUNTER — APPOINTMENT (OUTPATIENT)
Dept: CARDIOLOGY | Facility: CLINIC | Age: 85
End: 2020-07-30
Payer: MEDICARE

## 2020-07-30 ENCOUNTER — NON-APPOINTMENT (OUTPATIENT)
Age: 85
End: 2020-07-30

## 2020-07-30 VITALS
HEART RATE: 67 BPM | WEIGHT: 156 LBS | HEIGHT: 65 IN | DIASTOLIC BLOOD PRESSURE: 80 MMHG | BODY MASS INDEX: 25.99 KG/M2 | SYSTOLIC BLOOD PRESSURE: 148 MMHG

## 2020-07-30 PROCEDURE — 99213 OFFICE O/P EST LOW 20 MIN: CPT

## 2020-07-30 PROCEDURE — 93000 ELECTROCARDIOGRAM COMPLETE: CPT

## 2020-07-30 NOTE — REASON FOR VISIT
[FreeTextEntry1] : She was followed with the principal cardiac diagnosis of chronic atrial fibrillation.

## 2020-07-30 NOTE — DISCUSSION/SUMMARY
[FreeTextEntry1] : The patient's clinical condition is completely stable. Her underlying rhythm is atrial fibrillation with controlled ventricular rate. The patient's activities have been limited in part because of severe visual loss. This is being treated by a retina specialist. I suggested to the patient that she might be interested in obtaining books on tape. Today's electrocardiogram reveals atrial fibrillation with controlled ventricular rate LVH and nonspecific ST-T wave abnormalities. Based on today's evaluation I find the patient's cardiovascular status to be stable. Current medications will be continued.The patient has tolerated anticoagulation without difficulty. There have been no bleeding issues. She walks with the aid of a cane but her balance has been stable and there have been no falls.

## 2020-07-30 NOTE — HISTORY OF PRESENT ILLNESS
[FreeTextEntry1] : The patient describes no recent cardiac symptoms. There have been no symptoms of chest pain shortness of breath dizziness lightheadedness or palpitations. The patient remains quite active. There are been no exertional symptoms.

## 2020-07-30 NOTE — PHYSICAL EXAM
[Normal Appearance] : normal appearance [General Appearance - Well Nourished] : well nourished [Well Groomed] : well groomed [General Appearance - Well Developed] : well developed [No Deformities] : no deformities [General Appearance - In No Acute Distress] : no acute distress [Normal Conjunctiva] : the conjunctiva exhibited no abnormalities [No Oral Pallor] : no oral pallor [Normal Oral Mucosa] : normal oral mucosa [Eyelids - No Xanthelasma] : the eyelids demonstrated no xanthelasmas [Normal Jugular Venous A Waves Present] : normal jugular venous A waves present [No Oral Cyanosis] : no oral cyanosis [Normal Jugular Venous V Waves Present] : normal jugular venous V waves present [No Jugular Venous Rowland A Waves] : no jugular venous rowland A waves [Exaggerated Use Of Accessory Muscles For Inspiration] : no accessory muscle use [Respiration, Rhythm And Depth] : normal respiratory rhythm and effort [Auscultation Breath Sounds / Voice Sounds] : lungs were clear to auscultation bilaterally [Heart Rate And Rhythm] : heart rate and rhythm were normal [Murmurs] : no murmurs present [Heart Sounds] : normal S1 and S2 [Abdomen Soft] : soft [Abdomen Tenderness] : non-tender [Abdomen Mass (___ Cm)] : no abdominal mass palpated [Abnormal Walk] : normal gait [Gait - Sufficient For Exercise Testing] : the gait was sufficient for exercise testing [Skin Color & Pigmentation] : normal skin color and pigmentation [FreeTextEntry1] : Popliteal pulses 1+ bilaterally pedal pulses diminished [No Venous Stasis] : no venous stasis [] : no rash [Skin Lesions] : no skin lesions [No Xanthoma] : no  xanthoma was observed [Oriented To Time, Place, And Person] : oriented to person, place, and time [No Skin Ulcers] : no skin ulcer [Affect] : the affect was normal [Mood] : the mood was normal [No Anxiety] : not feeling anxious

## 2020-08-31 ENCOUNTER — RX RENEWAL (OUTPATIENT)
Age: 85
End: 2020-08-31

## 2020-09-21 ENCOUNTER — RX RENEWAL (OUTPATIENT)
Age: 85
End: 2020-09-21

## 2020-09-23 ENCOUNTER — APPOINTMENT (OUTPATIENT)
Dept: INTERNAL MEDICINE | Facility: CLINIC | Age: 85
End: 2020-09-23
Payer: MEDICARE

## 2020-09-23 PROCEDURE — G0008: CPT

## 2020-09-23 PROCEDURE — 90662 IIV NO PRSV INCREASED AG IM: CPT

## 2020-10-26 ENCOUNTER — RX RENEWAL (OUTPATIENT)
Age: 85
End: 2020-10-26

## 2021-01-28 ENCOUNTER — APPOINTMENT (OUTPATIENT)
Dept: CARDIOLOGY | Facility: CLINIC | Age: 86
End: 2021-01-28
Payer: MEDICARE

## 2021-01-28 ENCOUNTER — NON-APPOINTMENT (OUTPATIENT)
Age: 86
End: 2021-01-28

## 2021-01-28 VITALS
WEIGHT: 155 LBS | DIASTOLIC BLOOD PRESSURE: 70 MMHG | TEMPERATURE: 97.7 F | HEART RATE: 80 BPM | SYSTOLIC BLOOD PRESSURE: 170 MMHG | HEIGHT: 63 IN | BODY MASS INDEX: 27.46 KG/M2

## 2021-01-28 DIAGNOSIS — I48.0 PAROXYSMAL ATRIAL FIBRILLATION: ICD-10-CM

## 2021-01-28 PROCEDURE — 93000 ELECTROCARDIOGRAM COMPLETE: CPT

## 2021-01-28 PROCEDURE — 36415 COLL VENOUS BLD VENIPUNCTURE: CPT

## 2021-01-28 PROCEDURE — 99212 OFFICE O/P EST SF 10 MIN: CPT

## 2021-01-28 NOTE — DISCUSSION/SUMMARY
[FreeTextEntry1] : The patient continues to do very well clinically. She is asymptomatic. The underlying rhythm is atrial fibrillation. Today's electrocardiogram reveals atrial fibrillation LVH with nonspecific ST-T wave abnormalities. Today's systolic blood pressure was elevated but the patient did not take her medications on time. The current medications will be continued. A blood test was done mainly to check for renal function. The patient has not seen her primary care physician during the current pandemic

## 2021-01-29 LAB
ALBUMIN SERPL ELPH-MCNC: 4.5 G/DL
ALP BLD-CCNC: 85 U/L
ALT SERPL-CCNC: 23 U/L
ANION GAP SERPL CALC-SCNC: 17 MMOL/L
AST SERPL-CCNC: 24 U/L
BILIRUB SERPL-MCNC: 1.5 MG/DL
BUN SERPL-MCNC: 18 MG/DL
CALCIUM SERPL-MCNC: 10.1 MG/DL
CHLORIDE SERPL-SCNC: 102 MMOL/L
CO2 SERPL-SCNC: 23 MMOL/L
CREAT SERPL-MCNC: 1.07 MG/DL
GLUCOSE SERPL-MCNC: 89 MG/DL
POTASSIUM SERPL-SCNC: 3.9 MMOL/L
PROT SERPL-MCNC: 6.9 G/DL
SODIUM SERPL-SCNC: 142 MMOL/L

## 2021-03-01 ENCOUNTER — APPOINTMENT (OUTPATIENT)
Dept: OBGYN | Facility: CLINIC | Age: 86
End: 2021-03-01
Payer: MEDICARE

## 2021-03-01 VITALS
HEIGHT: 63 IN | SYSTOLIC BLOOD PRESSURE: 150 MMHG | DIASTOLIC BLOOD PRESSURE: 70 MMHG | BODY MASS INDEX: 26.93 KG/M2 | WEIGHT: 152 LBS

## 2021-03-01 PROCEDURE — G0101: CPT

## 2021-03-24 ENCOUNTER — RX RENEWAL (OUTPATIENT)
Age: 86
End: 2021-03-24

## 2021-04-07 ENCOUNTER — NON-APPOINTMENT (OUTPATIENT)
Age: 86
End: 2021-04-07

## 2021-04-07 ENCOUNTER — APPOINTMENT (OUTPATIENT)
Dept: INTERNAL MEDICINE | Facility: CLINIC | Age: 86
End: 2021-04-07
Payer: MEDICARE

## 2021-04-07 ENCOUNTER — LABORATORY RESULT (OUTPATIENT)
Age: 86
End: 2021-04-07

## 2021-04-07 VITALS
HEIGHT: 63 IN | DIASTOLIC BLOOD PRESSURE: 70 MMHG | TEMPERATURE: 98 F | SYSTOLIC BLOOD PRESSURE: 142 MMHG | WEIGHT: 160 LBS | BODY MASS INDEX: 28.35 KG/M2

## 2021-04-07 DIAGNOSIS — R92.8 OTHER ABNORMAL AND INCONCLUSIVE FINDINGS ON DIAGNOSTIC IMAGING OF BREAST: ICD-10-CM

## 2021-04-07 PROCEDURE — 99214 OFFICE O/P EST MOD 30 MIN: CPT | Mod: 25

## 2021-04-07 PROCEDURE — 36415 COLL VENOUS BLD VENIPUNCTURE: CPT

## 2021-04-07 NOTE — HISTORY OF PRESENT ILLNESS
[Spouse] : spouse [FreeTextEntry1] : sleeping too much  [de-identified] : Pt brought to office by her . She complains of fatigue. Says she just sits and watches tv all day and gets no activity. Has no other complaints other than fatigue. She has no chest pains or palpitations. She has gained 8 lb and says that she snacks a lot. She also does not cook much anymore and they usually eat out. During the winter she does not get much activity, but now that it has warmed up she intends on walking more.\par She wants something to give her more energy and that is the main reason for her visit.

## 2021-04-07 NOTE — PHYSICAL EXAM
[No JVD] : no jugular venous distention [Normal] : no respiratory distress, lungs were clear to auscultation bilaterally and no accessory muscle use [Coordination Grossly Intact] : coordination grossly intact [No Focal Deficits] : no focal deficits [Normal Gait] : normal gait [Normal Affect] : the affect was normal [Normal Insight/Judgement] : insight and judgment were intact [de-identified] : irreg [de-identified] : mild LLE edema

## 2021-04-07 NOTE — HEALTH RISK ASSESSMENT
[No] : In the past 12 months have you used drugs other than those required for medical reasons? No [Any fall with injury in past year] : Patient reported fall with injury in the past year [0] : 2) Feeling down, depressed, or hopeless: Not at all (0) [] : No [de-identified] : none [de-identified] : regular diet

## 2021-04-07 NOTE — CURRENT MEDS
[Takes medication as prescribed] : takes [None] : Patient does not have any barriers to medication adherence [Blood Thinners] : blood thinners

## 2021-04-07 NOTE — REVIEW OF SYSTEMS
[Fatigue] : fatigue [Negative] : Heme/Lymph [Fever] : no fever [Chills] : no chills [FreeTextEntry2] : + weight gain of 8 lb since the start of this year [FreeTextEntry9] : usual aches

## 2021-04-09 LAB
25(OH)D3 SERPL-MCNC: 38 NG/ML
BASOPHILS # BLD AUTO: 0.06 K/UL
BASOPHILS NFR BLD AUTO: 0.9 %
CHOLEST SERPL-MCNC: 148 MG/DL
EOSINOPHIL # BLD AUTO: 0.26 K/UL
EOSINOPHIL NFR BLD AUTO: 4 %
ESTIMATED AVERAGE GLUCOSE: 117 MG/DL
HBA1C MFR BLD HPLC: 5.7 %
HCT VFR BLD CALC: 46.2 %
HDLC SERPL-MCNC: 38 MG/DL
HGB BLD-MCNC: 14.9 G/DL
IMM GRANULOCYTES NFR BLD AUTO: 0.3 %
LDLC SERPL CALC-MCNC: 78 MG/DL
LYMPHOCYTES # BLD AUTO: 1.34 K/UL
LYMPHOCYTES NFR BLD AUTO: 20.4 %
MAN DIFF?: NORMAL
MCHC RBC-ENTMCNC: 32.2 PG
MCHC RBC-ENTMCNC: 32.3 GM/DL
MCV RBC AUTO: 99.8 FL
MONOCYTES # BLD AUTO: 0.66 K/UL
MONOCYTES NFR BLD AUTO: 10 %
NEUTROPHILS # BLD AUTO: 4.24 K/UL
NEUTROPHILS NFR BLD AUTO: 64.4 %
NONHDLC SERPL-MCNC: 110 MG/DL
PLATELET # BLD AUTO: 148 K/UL
RBC # BLD: 4.63 M/UL
RBC # FLD: 14 %
TRIGL SERPL-MCNC: 161 MG/DL
TSH SERPL-ACNC: 4.6 UIU/ML
WBC # FLD AUTO: 6.58 K/UL

## 2021-05-04 ENCOUNTER — APPOINTMENT (OUTPATIENT)
Dept: INTERNAL MEDICINE | Facility: CLINIC | Age: 86
End: 2021-05-04

## 2021-05-12 ENCOUNTER — RX RENEWAL (OUTPATIENT)
Age: 86
End: 2021-05-12

## 2021-06-01 ENCOUNTER — RESULT REVIEW (OUTPATIENT)
Age: 86
End: 2021-06-01

## 2021-06-25 ENCOUNTER — RX RENEWAL (OUTPATIENT)
Age: 86
End: 2021-06-25

## 2021-07-02 ENCOUNTER — APPOINTMENT (OUTPATIENT)
Dept: INTERNAL MEDICINE | Facility: CLINIC | Age: 86
End: 2021-07-02
Payer: MEDICARE

## 2021-07-02 VITALS
WEIGHT: 154 LBS | BODY MASS INDEX: 27.29 KG/M2 | TEMPERATURE: 98 F | DIASTOLIC BLOOD PRESSURE: 84 MMHG | SYSTOLIC BLOOD PRESSURE: 132 MMHG | HEIGHT: 63 IN

## 2021-07-02 PROCEDURE — 99214 OFFICE O/P EST MOD 30 MIN: CPT

## 2021-07-02 NOTE — PHYSICAL EXAM
[No JVD] : no jugular venous distention [Normal] : no respiratory distress, lungs were clear to auscultation bilaterally and no accessory muscle use [Normal Rate] : normal rate  [Coordination Grossly Intact] : coordination grossly intact [No Focal Deficits] : no focal deficits [Normal Gait] : normal gait [Normal Affect] : the affect was normal [Normal Insight/Judgement] : insight and judgment were intact [de-identified] : irreg [de-identified] : mild LLE edema [de-identified] : large hematoma left lateral thigh, bruise is resolving but the hematoma is still present not tender and no pain. No sign of infection.

## 2021-07-02 NOTE — COUNSELING
[Fall prevention counseling provided] : Fall prevention counseling provided [Adequate lighting] : Adequate lighting [Use proper foot wear] : Use proper foot wear [Use recommended devices] : Use recommended devices [FreeTextEntry1] : uses a cane for walksing

## 2021-07-02 NOTE — REVIEW OF SYSTEMS
[Negative] : Respiratory [Easy Bleeding] : easy bleeding [Easy Bruising] : easy bruising [Fever] : no fever [Fatigue] : no fatigue [FreeTextEntry9] : hematoma left leg

## 2021-07-02 NOTE — HEALTH RISK ASSESSMENT
[No] : In the past 12 months have you used drugs other than those required for medical reasons? No [Any fall with injury in past year] : Patient reported fall with injury in the past year [0] : 2) Feeling down, depressed, or hopeless: Not at all (0) [] : No [de-identified] : none [de-identified] : regular diet

## 2021-07-02 NOTE — HISTORY OF PRESENT ILLNESS
[Spouse] : spouse [FreeTextEntry1] : follow-up from a fall  [de-identified] : Pt here as the appointment was made by her son. She says she fell twice as she was ironing and leaned on the iron board which fell and she went with it. She now has a new ironing board which does not move. \par The fall was more than 2 weeks ago. She had a bruise on the left side which cleared but she has a lump under that region that is slowly getting better.  She is on Eliquis. No pain in that region. \par No chest pains, palpitations, dizziness.

## 2021-08-02 ENCOUNTER — APPOINTMENT (OUTPATIENT)
Dept: CARDIOLOGY | Facility: CLINIC | Age: 86
End: 2021-08-02
Payer: MEDICARE

## 2021-08-02 ENCOUNTER — NON-APPOINTMENT (OUTPATIENT)
Age: 86
End: 2021-08-02

## 2021-08-02 VITALS
HEART RATE: 72 BPM | HEIGHT: 63 IN | BODY MASS INDEX: 27.46 KG/M2 | TEMPERATURE: 97.8 F | DIASTOLIC BLOOD PRESSURE: 70 MMHG | WEIGHT: 155 LBS | SYSTOLIC BLOOD PRESSURE: 124 MMHG

## 2021-08-02 PROCEDURE — 99213 OFFICE O/P EST LOW 20 MIN: CPT

## 2021-08-02 PROCEDURE — 93000 ELECTROCARDIOGRAM COMPLETE: CPT

## 2021-08-02 NOTE — REASON FOR VISIT
[FreeTextEntry1] : Patient returns for followup today. She is followed with the principal diagnosis of chronic atrial fibrillation. There is also been some degree of memory impairment. The patient's clinical condition and my estimation is stable and improved. There have been no cardiac symptoms. There have been no symptoms of chest pain shortness of breath dizziness lightheadedness or palpitations.

## 2021-08-02 NOTE — DISCUSSION/SUMMARY
[FreeTextEntry1] : The patient is doing exceptionally well. There have been no acute cardiac symptoms; there have been no signs or symptoms of CHF or myocardial ischemia. Her blood pressure has been under excellent control 124/70 the pulse is 73 the cardiorespiratory examination is normal the extremities reveal trace leg edema the electrocardiogram reveals atrial fibrillation with controlled ventricular rate and minor nonspecific ST-T wave abnormalities. Based on my examination and assessment I believe the patient's cardiac status is stable. I plan to continue the current medications. The patient is receiving Eliquis she has tolerated very well. There havew been no bleeding issues or complications.Because of the patient's age I'm going to consider reducing the dosageat the time of the next visit.However she still qualifies for the full dosage at this time.

## 2021-09-15 ENCOUNTER — RESULT REVIEW (OUTPATIENT)
Age: 86
End: 2021-09-15

## 2021-09-20 ENCOUNTER — RX RENEWAL (OUTPATIENT)
Age: 86
End: 2021-09-20

## 2021-09-22 ENCOUNTER — RX RENEWAL (OUTPATIENT)
Age: 86
End: 2021-09-22

## 2021-11-08 ENCOUNTER — RX RENEWAL (OUTPATIENT)
Age: 86
End: 2021-11-08

## 2022-02-08 ENCOUNTER — NON-APPOINTMENT (OUTPATIENT)
Age: 87
End: 2022-02-08

## 2022-02-08 ENCOUNTER — APPOINTMENT (OUTPATIENT)
Dept: CARDIOLOGY | Facility: CLINIC | Age: 87
End: 2022-02-08
Payer: MEDICARE

## 2022-02-08 VITALS — HEIGHT: 63 IN | WEIGHT: 160 LBS | BODY MASS INDEX: 28.35 KG/M2

## 2022-02-08 PROCEDURE — 99212 OFFICE O/P EST SF 10 MIN: CPT

## 2022-02-08 PROCEDURE — 93000 ELECTROCARDIOGRAM COMPLETE: CPT

## 2022-02-08 NOTE — REASON FOR VISIT
[FreeTextEntry1] : The patient is followed with a principal diagnosis of chronic atrial fibrillation. She has done exceptionally well over the past several years and her cardiac status has been stable. There have been no symptoms of chest pain shortness of breath dizziness lightheadedness or palpitations. The patient remains quite active while using a cane for walking.\par

## 2022-02-08 NOTE — DISCUSSION/SUMMARY
[FreeTextEntry1] : The patient's clinical examination today is stable. She remains in atrial fibrillation with a controlled ventricular rate which is asymptomatic. The electrocardiogram reveals atrial fibrillation with nonspecific ST-T wave abnormalities. A repeat blood pressure today was 135/80 patient has had no recent falls. She still will follows for the regular dose of each ELIQUIS as her weight is greater than 60 kg and her renal function is normal. A stoma examination and assessment I believe the patient's cardiac status is stable no further cardiac tests or interventions are needed at this time.

## 2022-02-14 ENCOUNTER — APPOINTMENT (OUTPATIENT)
Dept: INTERNAL MEDICINE | Facility: CLINIC | Age: 87
End: 2022-02-14
Payer: MEDICARE

## 2022-02-14 ENCOUNTER — LABORATORY RESULT (OUTPATIENT)
Age: 87
End: 2022-02-14

## 2022-02-14 VITALS
DIASTOLIC BLOOD PRESSURE: 70 MMHG | SYSTOLIC BLOOD PRESSURE: 136 MMHG | TEMPERATURE: 98 F | BODY MASS INDEX: 28.17 KG/M2 | HEIGHT: 63 IN | WEIGHT: 159 LBS

## 2022-02-14 DIAGNOSIS — I48.20 CHRONIC ATRIAL FIBRILLATION, UNSP: ICD-10-CM

## 2022-02-14 DIAGNOSIS — L97.521 NON-PRESSURE CHRONIC ULCER OF OTHER PART OF LEFT FOOT LIMITED TO BREAKDOWN OF SKIN: ICD-10-CM

## 2022-02-14 DIAGNOSIS — L97.529 NON-PRESSURE CHRONIC ULCER OF OTHER PART OF LEFT FOOT WITH UNSPECIFIED SEVERITY: ICD-10-CM

## 2022-02-14 DIAGNOSIS — R23.4 CHANGES IN SKIN TEXTURE: ICD-10-CM

## 2022-02-14 DIAGNOSIS — R20.2 ANESTHESIA OF SKIN: ICD-10-CM

## 2022-02-14 DIAGNOSIS — S80.12XA CONTUSION OF LEFT LOWER LEG, INITIAL ENCOUNTER: ICD-10-CM

## 2022-02-14 DIAGNOSIS — F03.90 UNSPECIFIED DEMENTIA W/OUT BEHAVIORAL DISTURBANCE: ICD-10-CM

## 2022-02-14 DIAGNOSIS — Z87.42 PERSONAL HISTORY OF OTHER DISEASES OF THE FEMALE GENITAL TRACT: ICD-10-CM

## 2022-02-14 DIAGNOSIS — R29.898 OTHER SYMPTOMS AND SIGNS INVOLVING THE MUSCULOSKELETAL SYSTEM: ICD-10-CM

## 2022-02-14 DIAGNOSIS — L97.522 NON-PRESSURE CHRONIC ULCER OF OTHER PART OF LEFT FOOT WITH FAT LAYER EXPOSED: ICD-10-CM

## 2022-02-14 DIAGNOSIS — Z87.898 PERSONAL HISTORY OF OTHER SPECIFIED CONDITIONS: ICD-10-CM

## 2022-02-14 DIAGNOSIS — R60.0 LOCALIZED EDEMA: ICD-10-CM

## 2022-02-14 DIAGNOSIS — Z92.89 PERSONAL HISTORY OF OTHER MEDICAL TREATMENT: ICD-10-CM

## 2022-02-14 DIAGNOSIS — L89.95 PRESSURE ULCER OF UNSPECIFIED SITE, UNSTAGEABLE: ICD-10-CM

## 2022-02-14 DIAGNOSIS — R20.0 ANESTHESIA OF SKIN: ICD-10-CM

## 2022-02-14 PROCEDURE — 36415 COLL VENOUS BLD VENIPUNCTURE: CPT

## 2022-02-14 PROCEDURE — 99214 OFFICE O/P EST MOD 30 MIN: CPT | Mod: 25

## 2022-02-14 NOTE — HISTORY OF PRESENT ILLNESS
[FreeTextEntry8] : abdominal pain on Friday 2/11. \par Pt had diarrhea x 1 episode 2 days before, very loose stool. She had the pain for 1 day and did not have any further diarrhea, no N/V. Pt took Pepcid and that helped. No fever. . \par She is eating and defecating. Her last BM was this AM and it was normal.

## 2022-02-14 NOTE — HEALTH RISK ASSESSMENT
[Never] : Never [No] : In the past 12 months have you used drugs other than those required for medical reasons? No [No falls in past year] : Patient reported no falls in the past year [Assistive Device] : Patient uses an assistive device [0] : 2) Feeling down, depressed, or hopeless: Not at all (0) [de-identified] : none [de-identified] : regular diet  [de-identified] : cane

## 2022-02-14 NOTE — PHYSICAL EXAM
[Normal] : no acute distress, well nourished, well developed and well-appearing [No Respiratory Distress] : no respiratory distress  [Clear to Auscultation] : lungs were clear to auscultation bilaterally [Normal Rate] : normal rate  [Regular Rhythm] : with a regular rhythm [No Edema] : there was no peripheral edema [Soft] : abdomen soft [Non Tender] : non-tender [Non-distended] : non-distended [No Masses] : no abdominal mass palpated [Normal Bowel Sounds] : normal bowel sounds [Normal Affect] : the affect was normal [Alert and Oriented x3] : oriented to person, place, and time [Normal Mood] : the mood was normal [de-identified] : irreg [de-identified] : walks with cane

## 2022-02-15 LAB
ALBUMIN SERPL ELPH-MCNC: 4.2 G/DL
ALP BLD-CCNC: 107 U/L
ALT SERPL-CCNC: 21 U/L
ANION GAP SERPL CALC-SCNC: 17 MMOL/L
AST SERPL-CCNC: 28 U/L
BASOPHILS # BLD AUTO: 0.07 K/UL
BASOPHILS NFR BLD AUTO: 1 %
BILIRUB SERPL-MCNC: 1.4 MG/DL
BUN SERPL-MCNC: 15 MG/DL
CALCIUM SERPL-MCNC: 9.8 MG/DL
CHLORIDE SERPL-SCNC: 104 MMOL/L
CHOLEST SERPL-MCNC: 150 MG/DL
CO2 SERPL-SCNC: 22 MMOL/L
CREAT SERPL-MCNC: 0.89 MG/DL
EOSINOPHIL # BLD AUTO: 0.2 K/UL
EOSINOPHIL NFR BLD AUTO: 2.7 %
ESTIMATED AVERAGE GLUCOSE: 120 MG/DL
GLUCOSE SERPL-MCNC: 118 MG/DL
HBA1C MFR BLD HPLC: 5.8 %
HCT VFR BLD CALC: 47.6 %
HDLC SERPL-MCNC: 38 MG/DL
HGB BLD-MCNC: 15.3 G/DL
IMM GRANULOCYTES NFR BLD AUTO: 0.4 %
LDLC SERPL CALC-MCNC: 76 MG/DL
LYMPHOCYTES # BLD AUTO: 1.45 K/UL
LYMPHOCYTES NFR BLD AUTO: 19.8 %
MAN DIFF?: NORMAL
MCHC RBC-ENTMCNC: 31.1 PG
MCHC RBC-ENTMCNC: 32.1 GM/DL
MCV RBC AUTO: 96.7 FL
MONOCYTES # BLD AUTO: 0.76 K/UL
MONOCYTES NFR BLD AUTO: 10.4 %
NEUTROPHILS # BLD AUTO: 4.82 K/UL
NEUTROPHILS NFR BLD AUTO: 65.7 %
NONHDLC SERPL-MCNC: 111 MG/DL
PLATELET # BLD AUTO: 152 K/UL
POTASSIUM SERPL-SCNC: 3.9 MMOL/L
PROT SERPL-MCNC: 6.5 G/DL
RBC # BLD: 4.92 M/UL
RBC # FLD: 13.2 %
SODIUM SERPL-SCNC: 142 MMOL/L
TRIGL SERPL-MCNC: 178 MG/DL
TSH SERPL-ACNC: 6.76 UIU/ML
WBC # FLD AUTO: 7.33 K/UL

## 2022-03-28 ENCOUNTER — APPOINTMENT (OUTPATIENT)
Dept: OBGYN | Facility: CLINIC | Age: 87
End: 2022-03-28
Payer: MEDICARE

## 2022-03-28 VITALS
HEIGHT: 63 IN | SYSTOLIC BLOOD PRESSURE: 136 MMHG | BODY MASS INDEX: 28 KG/M2 | WEIGHT: 158 LBS | DIASTOLIC BLOOD PRESSURE: 70 MMHG

## 2022-03-28 PROCEDURE — G0101: CPT

## 2022-05-01 ENCOUNTER — RX RENEWAL (OUTPATIENT)
Age: 87
End: 2022-05-01

## 2022-06-01 ENCOUNTER — APPOINTMENT (OUTPATIENT)
Dept: CARDIOLOGY | Facility: CLINIC | Age: 87
End: 2022-06-01

## 2022-06-03 ENCOUNTER — RESULT REVIEW (OUTPATIENT)
Age: 87
End: 2022-06-03

## 2022-06-04 ENCOUNTER — NON-APPOINTMENT (OUTPATIENT)
Age: 87
End: 2022-06-04

## 2022-06-06 ENCOUNTER — RX RENEWAL (OUTPATIENT)
Age: 87
End: 2022-06-06

## 2022-06-07 ENCOUNTER — APPOINTMENT (OUTPATIENT)
Dept: CARDIOLOGY | Facility: CLINIC | Age: 87
End: 2022-06-07
Payer: MEDICARE

## 2022-06-07 ENCOUNTER — NON-APPOINTMENT (OUTPATIENT)
Age: 87
End: 2022-06-07

## 2022-06-07 VITALS — DIASTOLIC BLOOD PRESSURE: 80 MMHG | SYSTOLIC BLOOD PRESSURE: 160 MMHG | HEART RATE: 66 BPM

## 2022-06-07 VITALS
HEART RATE: 71 BPM | WEIGHT: 162 LBS | BODY MASS INDEX: 28.7 KG/M2 | OXYGEN SATURATION: 96 % | SYSTOLIC BLOOD PRESSURE: 170 MMHG | TEMPERATURE: 98 F | HEIGHT: 63 IN | DIASTOLIC BLOOD PRESSURE: 90 MMHG

## 2022-06-07 PROCEDURE — 93000 ELECTROCARDIOGRAM COMPLETE: CPT

## 2022-06-07 PROCEDURE — 36415 COLL VENOUS BLD VENIPUNCTURE: CPT

## 2022-06-07 PROCEDURE — 99214 OFFICE O/P EST MOD 30 MIN: CPT

## 2022-06-07 RX ORDER — MUPIROCIN 20 MG/G
2 OINTMENT TOPICAL
Qty: 1 | Refills: 3 | Status: DISCONTINUED | COMMUNITY
Start: 2020-04-16 | End: 2022-06-07

## 2022-06-07 RX ORDER — MULTIVIT-MIN/FA/LYCOPEN/LUTEIN .4-300-25
TABLET ORAL
Refills: 0 | Status: ACTIVE | COMMUNITY

## 2022-06-07 NOTE — CARDIOLOGY SUMMARY
[de-identified] : 6/7/22 Atrial fibrillation HR 66 [de-identified] : 1/28/2020 LVEF 70%. Dilated LA. Dilated RA. Mild to moderate MR. Moderate to severe TR. Estimated PASP 45-55 mmHg (moderate pulm HTN), mild to moderate A!.

## 2022-06-07 NOTE — HISTORY OF PRESENT ILLNESS
[FreeTextEntry1] : 89 year old female with hypertension, atrial fibrillation on Eliquis, hypertriglyceridemia, prediabetes, thyroid nodule, Alzheimer's.

## 2022-06-07 NOTE — PHYSICAL EXAM
[No Acute Distress] : no acute distress [Irregularly Irregular] : irregularly irregular [Clear Lung Fields] : clear lung fields [Good Air Entry] : good air entry [No Respiratory Distress] : no respiratory distress  [Normal Gait] : normal gait [No Rash] : no rash [Moves all extremities] : moves all extremities [Normal Speech] : normal speech [Alert and Oriented] : alert and oriented

## 2022-06-07 NOTE — REASON FOR VISIT
[Other: ____] : [unfilled] [FreeTextEntry1] : Maite Haque presents for follow up visit. She reports that her son noticed that she was short of breath after climbing 1 flight of stairs. She feels well. She denies chest pain, SOB, palpitations, dizziness or syncope.

## 2022-06-07 NOTE — REVIEW OF SYSTEMS
[Weight Gain (___ Lbs)] : [unfilled] ~Ulb weight gain [Fever] : no fever [Headache] : no headache [Chills] : no chills [Feeling Fatigued] : not feeling fatigued [Dyspnea on exertion] : not dyspnea during exertion [Chest Discomfort] : no chest discomfort [Palpitations] : no palpitations [Syncope] : no syncope [Cough] : no cough [Wheezing] : no wheezing [Rash] : no rash [Dizziness] : no dizziness [Confusion] : no confusion was observed [Easy Bleeding] : no tendency for easy bleeding [Easy Bruising] : no tendency for easy bruising

## 2022-06-10 LAB
ANION GAP SERPL CALC-SCNC: 16 MMOL/L
BUN SERPL-MCNC: 15 MG/DL
CALCIUM SERPL-MCNC: 9.6 MG/DL
CHLORIDE SERPL-SCNC: 103 MMOL/L
CO2 SERPL-SCNC: 23 MMOL/L
CREAT SERPL-MCNC: 0.84 MG/DL
EGFR: 66 ML/MIN/1.73M2
GLUCOSE SERPL-MCNC: 113 MG/DL
POTASSIUM SERPL-SCNC: 4 MMOL/L
SODIUM SERPL-SCNC: 142 MMOL/L

## 2022-06-22 ENCOUNTER — APPOINTMENT (OUTPATIENT)
Dept: CARDIOLOGY | Facility: CLINIC | Age: 87
End: 2022-06-22
Payer: MEDICARE

## 2022-06-22 ENCOUNTER — NON-APPOINTMENT (OUTPATIENT)
Age: 87
End: 2022-06-22

## 2022-06-22 PROCEDURE — 93306 TTE W/DOPPLER COMPLETE: CPT

## 2022-06-23 ENCOUNTER — NON-APPOINTMENT (OUTPATIENT)
Age: 87
End: 2022-06-23

## 2022-07-05 ENCOUNTER — APPOINTMENT (OUTPATIENT)
Dept: CARDIOLOGY | Facility: CLINIC | Age: 87
End: 2022-07-05

## 2022-07-05 VITALS
HEIGHT: 63 IN | WEIGHT: 163 LBS | BODY MASS INDEX: 28.88 KG/M2 | SYSTOLIC BLOOD PRESSURE: 142 MMHG | OXYGEN SATURATION: 96 % | DIASTOLIC BLOOD PRESSURE: 70 MMHG

## 2022-07-05 DIAGNOSIS — R06.00 DYSPNEA, UNSPECIFIED: ICD-10-CM

## 2022-07-05 PROCEDURE — 99214 OFFICE O/P EST MOD 30 MIN: CPT

## 2022-07-05 PROCEDURE — 36415 COLL VENOUS BLD VENIPUNCTURE: CPT

## 2022-07-05 RX ORDER — HYDROCHLOROTHIAZIDE 12.5 MG/1
12.5 TABLET ORAL
Qty: 90 | Refills: 0 | Status: COMPLETED | COMMUNITY
Start: 2021-09-20

## 2022-07-05 NOTE — PHYSICAL EXAM
[No Acute Distress] : no acute distress [Irregularly Irregular] : irregularly irregular [Clear Lung Fields] : clear lung fields [Good Air Entry] : good air entry [No Respiratory Distress] : no respiratory distress  [Normal Gait] : normal gait [Edema ___] : edema [unfilled] [No Rash] : no rash [Moves all extremities] : moves all extremities [Normal Speech] : normal speech [Alert and Oriented] : alert and oriented

## 2022-07-05 NOTE — REASON FOR VISIT
[FreeTextEntry1] : Maite Abel presents for follow up visit. At last visit, increased hydrochlorothiazide to 25mg daily d/t elevated blood pressure. Ms. Abel reports feeling well. She denies chest pain, SOB, palpitations, dizziness or syncope.\par

## 2022-07-05 NOTE — CARDIOLOGY SUMMARY
[de-identified] : 6/7/22 Atrial fibrillation HR 66 [de-identified] : 6/27/22 LVEF 65%. Dilated LA. Severely dilated RA. Moderate MR. Moderate to severe TR. Estimated PASP 55 mmHg (moderate pulm HTN). Moderate AR.

## 2022-07-05 NOTE — REVIEW OF SYSTEMS
[Lower Ext Edema] : lower extremity edema [Fever] : no fever [Headache] : no headache [Chills] : no chills [Feeling Fatigued] : not feeling fatigued [SOB] : no shortness of breath [Dyspnea on exertion] : not dyspnea during exertion [Chest Discomfort] : no chest discomfort [Palpitations] : no palpitations [Syncope] : no syncope [Cough] : no cough [Wheezing] : no wheezing [Rash] : no rash [Dizziness] : no dizziness [Confusion] : no confusion was observed [Easy Bleeding] : no tendency for easy bleeding [Easy Bruising] : no tendency for easy bruising

## 2022-07-06 ENCOUNTER — RX RENEWAL (OUTPATIENT)
Age: 87
End: 2022-07-06

## 2022-07-06 LAB
ANION GAP SERPL CALC-SCNC: 15 MMOL/L
BUN SERPL-MCNC: 15 MG/DL
CALCIUM SERPL-MCNC: 9.8 MG/DL
CHLORIDE SERPL-SCNC: 100 MMOL/L
CO2 SERPL-SCNC: 25 MMOL/L
CREAT SERPL-MCNC: 0.79 MG/DL
EGFR: 71 ML/MIN/1.73M2
GLUCOSE SERPL-MCNC: 113 MG/DL
POTASSIUM SERPL-SCNC: 4 MMOL/L
SODIUM SERPL-SCNC: 140 MMOL/L

## 2022-07-11 ENCOUNTER — APPOINTMENT (OUTPATIENT)
Dept: ENDOCRINOLOGY | Facility: CLINIC | Age: 87
End: 2022-07-11

## 2022-07-11 VITALS
SYSTOLIC BLOOD PRESSURE: 130 MMHG | DIASTOLIC BLOOD PRESSURE: 60 MMHG | TEMPERATURE: 98 F | HEART RATE: 90 BPM | OXYGEN SATURATION: 95 %

## 2022-07-11 PROCEDURE — 99203 OFFICE O/P NEW LOW 30 MIN: CPT

## 2022-07-11 NOTE — HISTORY OF PRESENT ILLNESS
[FreeTextEntry1] : Jul 11, 2022    \par \par PCP:  Dr. Garrett Chamberlain   \par         Gyn:  Dr. Lawrence Mendelowitz\par         Card:  Dr. Jordan Robb \par \par CC:   R thyroid nodule\par \par \par 89 yo noted to have a R sided thyroid nodule.\par Went for ultrasound:\par \par \par "CLINICAL INFORMATION: 89-year-old female with thyroid nodule\par \par COMPARISON: None available.\par \par TECHNIQUE: Sonography of the thyroid. \par \par FINDINGS:\par Right Lobe: 3.9 x  3.0 x 1.9 cm. Hypoechoic nodule measuring 1.9 x 2.0 x 1.7 cm.\par \par Left Lobe: 3.4 x 1.2 x 2.7 cm. No discrete nodules.\par \par Isthmus: 3.1 mm.\par \par Cervical Lymph Nodes: No enlarged or abnormal morphology cervical nodes.\par \par \par \par IMPRESSION: \par \par Hypoechoic well-circumscribed right lobe nodule. This nodule is TIRADS TR 5, highly suspicious. Fine needle aspiration is recommended."   as read by Dr. Chan.      \par \par Most recent TFTs include TSH 6..76\par \par : :\par Constitutional:  Alert, well nourished, healthy appearance, no acute distress \par Eyes:  No proptosis, no stare\par Thyroid:\par Pulmonary:  No respiratory distress, no accessory muscle use; normal rate and effort\par Cardiac:  Normal rate\par Vascular: \par Endocrine:  No stigmata of Cushing’s Syndrome\par Musculoskeletal:  Normal gait, no involuntary movements\par Neurology:  No tremors\par Affect/Mood/Psych:  Oriented x 3; normal affect, normal insight/judgement, normal mood \par .\par \par Impression:  Chronic voice hoarseness\par No known FHx of thyroid disease.\par On Elaquis\par \par Plan:  Return to [facility] for FNAB and ROV here in November.  \par

## 2022-07-26 ENCOUNTER — RESULT REVIEW (OUTPATIENT)
Age: 87
End: 2022-07-26

## 2022-07-28 ENCOUNTER — RESULT REVIEW (OUTPATIENT)
Age: 87
End: 2022-07-28

## 2022-07-29 ENCOUNTER — RESULT REVIEW (OUTPATIENT)
Age: 87
End: 2022-07-29

## 2022-08-03 ENCOUNTER — APPOINTMENT (OUTPATIENT)
Dept: ENDOCRINOLOGY | Facility: CLINIC | Age: 87
End: 2022-08-03

## 2022-08-03 PROCEDURE — 99213 OFFICE O/P EST LOW 20 MIN: CPT

## 2022-08-04 ENCOUNTER — NON-APPOINTMENT (OUTPATIENT)
Age: 87
End: 2022-08-04

## 2022-08-08 NOTE — HISTORY OF PRESENT ILLNESS
[Home] : at home, [unfilled] , at the time of the visit. [Medical Office: ([facility])___] : at the medical office located in  [Spouse] : spouse [Verbal consent obtained from patient] : the patient, [unfilled] [FreeTextEntry1] : Aug 03, 2022     telephonic\par PCP:  Dr. Garrett Chamberlain   \par         Gyn:  Dr. Lawrence Mendelowitz\par         Card:  Dr. Jordan Robb \par \par CC:   R thyroid nodule\par         Elevated TSH\par \par Cytology report from FNAB:  [place] VI:  papillary thyroid cancer.\par \par Imp/Plan:  Results discussed with patient and .\par She is agreeable to surveillance for now with follow up US thyorid and TFTs prior to follow up visit in January - after starting levothyrxoine 25 mcg daily.  \par \par \par \par Jul 11, 2022    \par \par PCP:  Dr. Garrett Chamberlain   \par         Gyn:  Dr. Lawrence Mendelowitz\par         Card:  Dr. Jordan Robb \par \par CC:   R thyroid nodule\par \par \par 89 yo noted to have a R sided thyroid nodule.\par Went for ultrasound:\par \par \par "CLINICAL INFORMATION: 89-year-old female with thyroid nodule\par \par COMPARISON: None available.\par \par TECHNIQUE: Sonography of the thyroid. \par \par FINDINGS:\par Right Lobe: 3.9 x  3.0 x 1.9 cm. Hypoechoic nodule measuring 1.9 x 2.0 x 1.7 cm.\par \par Left Lobe: 3.4 x 1.2 x 2.7 cm. No discrete nodules.\par \par Isthmus: 3.1 mm.\par \par Cervical Lymph Nodes: No enlarged or abnormal morphology cervical nodes.\par \par \par \par IMPRESSION: \par \par Hypoechoic well-circumscribed right lobe nodule. This nodule is TIRADS TR 5, highly suspicious. Fine needle aspiration is recommended."   as read by Dr. Chan.      \par \par Most recent TFTs include TSH 6..76\par \par : :\par Constitutional:  Alert, well nourished, healthy appearance, no acute distress \par Eyes:  No proptosis, no stare\par Thyroid:\par Pulmonary:  No respiratory distress, no accessory muscle use; normal rate and effort\par Cardiac:  Normal rate\par Vascular: \par Endocrine:  No stigmata of Cushing’s Syndrome\par Musculoskeletal:  Normal gait, no involuntary movements\par Neurology:  No tremors\par Affect/Mood/Psych:  Oriented x 3; normal affect, normal insight/judgement, normal mood \par .\par \par Impression:  Chronic voice hoarseness\par No known FHx of thyroid disease.\par On Elaquis\par \par Plan:  Return to [facility] Radiology for FNAB and ROV here in November.  \par

## 2022-08-10 ENCOUNTER — NON-APPOINTMENT (OUTPATIENT)
Age: 87
End: 2022-08-10

## 2022-08-15 ENCOUNTER — NON-APPOINTMENT (OUTPATIENT)
Age: 87
End: 2022-08-15

## 2022-08-15 ENCOUNTER — APPOINTMENT (OUTPATIENT)
Dept: CARDIOLOGY | Facility: CLINIC | Age: 87
End: 2022-08-15

## 2022-08-15 VITALS
SYSTOLIC BLOOD PRESSURE: 124 MMHG | HEIGHT: 63 IN | WEIGHT: 163 LBS | DIASTOLIC BLOOD PRESSURE: 70 MMHG | OXYGEN SATURATION: 95 % | BODY MASS INDEX: 28.88 KG/M2 | HEART RATE: 73 BPM

## 2022-08-15 DIAGNOSIS — Z86.79 PERSONAL HISTORY OF OTHER DISEASES OF THE CIRCULATORY SYSTEM: ICD-10-CM

## 2022-08-15 PROCEDURE — 99214 OFFICE O/P EST MOD 30 MIN: CPT

## 2022-08-15 PROCEDURE — 93000 ELECTROCARDIOGRAM COMPLETE: CPT

## 2022-08-15 NOTE — DISCUSSION/SUMMARY
[FreeTextEntry1] : Patient was recently found to have a thyroid nodule and neck biopsy diagnosed papillary thyroid CA.  The patient has been followed actively by Dr. Hellerman of endocrinology who has treated her with levothyroxine to bring TSH down and to thereby decrease stimulation of the nodule.\par \par There was no plan for immediate surgical intervention\par \par Based on my examination and assessment today I believe the patient's cardiovascular status is stable; she has chronic atrial fibrillation which is well controlled today's EKG reveals atrial fibrillation LVH with nonspecific ST-T wave abnormalities.\par \par Current medications will be continued.  I am considering decreasing the dose of Eliquis from 5 mg to 2.5 mg simply because of the patient's age.  According to recommendations she still qualifies for the higher dose as her renal function is normal and her weight is greater than 60 kg.

## 2022-08-15 NOTE — REASON FOR VISIT
[FreeTextEntry1] : And is followed with chronic atrial fibrillation.  This has been entirely stable and well-tolerated there have been no cardiac symptoms.  She has had no symptoms of acute chest pain shortness of breath or palpitations.  Leave the patient has made very good progress over the past 2 years.\par

## 2022-08-21 ENCOUNTER — NON-APPOINTMENT (OUTPATIENT)
Age: 87
End: 2022-08-21

## 2022-10-18 ENCOUNTER — APPOINTMENT (OUTPATIENT)
Dept: INTERNAL MEDICINE | Facility: CLINIC | Age: 87
End: 2022-10-18

## 2022-10-18 PROCEDURE — G0008: CPT

## 2022-10-18 PROCEDURE — 90662 IIV NO PRSV INCREASED AG IM: CPT

## 2022-11-11 ENCOUNTER — APPOINTMENT (OUTPATIENT)
Dept: ENDOCRINOLOGY | Facility: CLINIC | Age: 87
End: 2022-11-11

## 2022-11-11 ENCOUNTER — LABORATORY RESULT (OUTPATIENT)
Age: 87
End: 2022-11-11

## 2022-11-11 VITALS
SYSTOLIC BLOOD PRESSURE: 122 MMHG | OXYGEN SATURATION: 97 % | DIASTOLIC BLOOD PRESSURE: 64 MMHG | WEIGHT: 168 LBS | BODY MASS INDEX: 29.76 KG/M2 | HEART RATE: 69 BPM

## 2022-11-11 PROCEDURE — 36415 COLL VENOUS BLD VENIPUNCTURE: CPT

## 2022-11-11 PROCEDURE — 99214 OFFICE O/P EST MOD 30 MIN: CPT | Mod: 25

## 2022-11-11 NOTE — HISTORY OF PRESENT ILLNESS
[FreeTextEntry1] : Nov 11, 2022     in person     accompanied by her  in [facility] [place]\par \par PCP:  Dr. Garrett Chamberlain   \par         Gyn:  Dr. Lawrence Mendelowitz\par         Card:  Dr. Jordan Robb \par \par CC:   R thyroid nodule\par         Elevated TSH\par \par 89 yo mother of three visits to follow up on dominant R sided thyroid nodule. \par Cytology report from FNAB:  [place] VI:  papillary thyroid cancer.\par Neither patient nor  are exited about surgical option.\par \par : :\par Constitutional:  Alert, well nourished, healthy appearance, no acute distress \par Eyes:  No proptosis, no stare\par Thyroid:  fullness R thyroid lobe\par Pulmonary:  No respiratory distress, no accessory muscle use; normal rate and effort\par Cardiac:  Normal rate\par Vascular: \par Endocrine:  No stigmata of Cushing’s Syndrome\par Musculoskeletal:  Normal gait, no involuntary movements\par Neurology:  No tremors\par Affect/Mood/Psych:  Oriented x 3; normal affect, normal insight/judgement, normal mood \par .\par \par IPlan:  After last visit, started on levothyroxine, striving to normalize elevated TSH so as to not stimulate growth of the thyroid nodule   She will have TFTs today and go for f/u US thyroid Monday after Thanksgiving, call me a week later to go over labs and US report and ROV in April 2023.\par \par \par \par \par Aug 03, 2022     telephonic\par PCP:  Dr. Garrett Chamberlain   \par         Gyn:  Dr. Lawrence Mendelowitz\par         Card:  Dr. Jordan Robb \par \par CC:   R thyroid nodule\par         Elevated TSH\par \par Cytology report from FNAB:  [place] VI:  papillary thyroid cancer.\par \par Imp/Plan:  Results discussed with patient and .\par She is agreeable to surveillance for now with follow up US thyorid and TFTs prior to follow up visit in January - after starting levothyrxoine 25 mcg daily.  \par \par \par \par Jul 11, 2022    \par \par PCP:  Dr. Garrett Chamberlain   \par         Gyn:  Dr. Lawrence Mendelowitz\par         Card:  Dr. Jordan Robb \par \par CC:   R thyroid nodule\par \par \par 89 yo noted to have a R sided thyroid nodule.\par Went for ultrasound:\par \par \par "CLINICAL INFORMATION: 89-year-old female with thyroid nodule\par \par COMPARISON: None available.\par \par TECHNIQUE: Sonography of the thyroid. \par \par FINDINGS:\par Right Lobe: 3.9 x  3.0 x 1.9 cm. Hypoechoic nodule measuring 1.9 x 2.0 x 1.7 cm.\par \par Left Lobe: 3.4 x 1.2 x 2.7 cm. No discrete nodules.\par \par Isthmus: 3.1 mm.\par \par Cervical Lymph Nodes: No enlarged or abnormal morphology cervical nodes.\par \par \par \par IMPRESSION: \par \par Hypoechoic well-circumscribed right lobe nodule. This nodule is TIRADS TR 5, highly suspicious. Fine needle aspiration is recommended."   as read by Dr. Chan.      \par \par Most recent TFTs include TSH 6..76\par \par : :\par Constitutional:  Alert, well nourished, healthy appearance, no acute distress \par Eyes:  No proptosis, no stare\par Thyroid:\par Pulmonary:  No respiratory distress, no accessory muscle use; normal rate and effort\par Cardiac:  Normal rate\par Vascular: \par Endocrine:  No stigmata of Cushing’s Syndrome\par Musculoskeletal:  Normal gait, no involuntary movements\par Neurology:  No tremors\par Affect/Mood/Psych:  Oriented x 3; normal affect, normal insight/judgement, normal mood \par .\par \par Impression:  Chronic voice hoarseness\par No known FHx of thyroid disease.\par On Elaquis\par \par Plan:  Return to [facility] Radiology for FNAB and ROV here in November.  \par

## 2022-11-14 ENCOUNTER — RX RENEWAL (OUTPATIENT)
Age: 87
End: 2022-11-14

## 2022-11-28 ENCOUNTER — RESULT REVIEW (OUTPATIENT)
Age: 87
End: 2022-11-28

## 2022-12-04 ENCOUNTER — NON-APPOINTMENT (OUTPATIENT)
Age: 87
End: 2022-12-04

## 2022-12-04 LAB
THYROGLOB AB SERPL IA-ACNC: <1.8 IU/ML
TSH SERPL-ACNC: 5.04 UIU/ML

## 2023-01-20 ENCOUNTER — LABORATORY RESULT (OUTPATIENT)
Age: 88
End: 2023-01-20

## 2023-01-20 ENCOUNTER — APPOINTMENT (OUTPATIENT)
Dept: INTERNAL MEDICINE | Facility: CLINIC | Age: 88
End: 2023-01-20
Payer: MEDICARE

## 2023-01-20 VITALS
WEIGHT: 161 LBS | HEART RATE: 78 BPM | HEIGHT: 63 IN | SYSTOLIC BLOOD PRESSURE: 136 MMHG | DIASTOLIC BLOOD PRESSURE: 76 MMHG | OXYGEN SATURATION: 95 % | BODY MASS INDEX: 28.53 KG/M2

## 2023-01-20 DIAGNOSIS — R26.89 OTHER ABNORMALITIES OF GAIT AND MOBILITY: ICD-10-CM

## 2023-01-20 PROCEDURE — 99213 OFFICE O/P EST LOW 20 MIN: CPT | Mod: 25

## 2023-01-20 PROCEDURE — 36415 COLL VENOUS BLD VENIPUNCTURE: CPT

## 2023-01-20 PROCEDURE — G0439: CPT

## 2023-01-20 PROCEDURE — G0444 DEPRESSION SCREEN ANNUAL: CPT | Mod: 59

## 2023-01-20 RX ORDER — CITALOPRAM HYDROBROMIDE 10 MG/1
10 TABLET, FILM COATED ORAL
Refills: 0 | Status: ACTIVE | COMMUNITY

## 2023-01-20 NOTE — HISTORY OF PRESENT ILLNESS
[PMH Reviewed and Updated] : past medical history reviewed and updated [PSH Reviewed and Updated] : past surgical history reviewed and updated [Family History Reviewed and Updated] : family history reviewed and updated [Medication and Allergies Reconciled] : medication and allergies reconciled [0] : 0 [Retired] : retired from work [Never] : has never used illicit drugs [None] : The patient does not exercise [Compliant with medications] : compliant with medications [Spouse] : spouse [Friends] : friends [Children] : children [Extended Family] : extended family [Needs some help with ADLs] : need some help with ADLs [History of falls] : history of falls [Seatbelts] : seatbelts [Smoke Detectors] : smoke detectors [Carbon Monoxide Detector] : carbon monoxide detector [Hot Water Temp <120F] : hot water temp <120F [Bathroom Grab Bars] : bathroom grab bars [Fall Prevention Measures] : fall prevention measures [Sunscreen] : sunscreen [Patient Has Full Capacity] : this patient has full decision making capacity for discussion of advance care planning [Patient Has Designated Health Care Proxy/Advanced Directive] : patient has designated Health Care Proxy/Advanced Directive [FreeTextEntry1] : wellness exam  [de-identified] : Pt here for medicare annual wellness. She  has no complaints. She had a recent fall where she was on the couch sleeping, an fell on the floor.  She was taken to [place] and had CT head. Work up was overall ok. She was not admitted. \par Pt thinks she has had the bivalent booster.\par Pt took citalopram for the first time today, given by Dr Logan. States that it caused her to have diarrhea and she does not want to take it. She does not feel like she needs a med for depression or anxiety. \par No cardiac complaints [Over the Past 2 Weeks, Have You Felt Down, Depressed, or Hopeless?] : 1.) Over the past 2 weeks, have you felt down, depressed, or hopeless? No [Over the Past 2 Weeks, Have You Felt Little Interest or Pleasure Doing Things?] : 2.) Over the past 2 weeks, have you felt little interest or pleasure doing things? No [Does not drive] : does not drive [Bicycle Helmet] : not using bicycle helmet [Motorcycle Helmet] : not using motorcycle helmet [Safe Driving Habits] : not using safe driving habits [Gun Trigger Locks] : not using gun trigger locks [Gun Safe] : not using a gun safe [CPR Training for Household] : did not receive CPR training for patient [CPR Training for Patient] : did not receive CPR training for patient [FreeTextEntry2] : none [de-identified] : none [de-identified] : none

## 2023-01-20 NOTE — DATA REVIEWED
[FreeTextEntry1] : reviewed Dr Hellerman's last note and last labs.\par Reviewed last mammoa nd dexa

## 2023-01-20 NOTE — REVIEW OF SYSTEMS
[Memory Loss] : memory loss [Negative] : Heme/Lymph [Fever] : no fever [Fatigue] : no fatigue [Headache] : no headache [de-identified] : walks with walker

## 2023-01-20 NOTE — PHYSICAL EXAM
[___/1] : [unfilled]/1   [___/3] : [unfilled]/3  [___/2] : [unfilled]/2 [___/4] : [unfilled]/4 [Dementia] : Dementia [No Acute Distress] : no acute distress [Well Nourished] : well nourished [Well Developed] : well developed [Normal Sclera/Conjunctiva] : normal sclera/conjunctiva [Normal Outer Ear/Nose] : the outer ears and nose were normal in appearance [No JVD] : no jugular venous distention [No Lymphadenopathy] : no lymphadenopathy [Normal Rate] : normal rate  [Regular Rhythm] : with a regular rhythm [Normal S1, S2] : normal S1 and S2 [No Murmur] : no murmur heard [No Carotid Bruits] : no carotid bruits [Normal Appearance] : normal in appearance [No Masses] : no palpable masses [No Nipple Discharge] : no nipple discharge [No Axillary Lymphadenopathy] : no axillary lymphadenopathy [Normal] : soft, non-tender, non-distended, no masses palpated, no HSM and normal bowel sounds [No CVA Tenderness] : no CVA  tenderness [No Spinal Tenderness] : no spinal tenderness [Grossly Normal Strength/Tone] : grossly normal strength/tone [No Focal Deficits] : no focal deficits [Normal Affect] : the affect was normal [Normal Mood] : the mood was normal [de-identified] : regular at present, no murmur appreciated [de-identified] : bruise that is healing on chest. [de-identified] : skin leisons, none suspicious, she has a bump on her head from a recent fall [de-identified] : cannot complete the cognitive eval. She clearly has some memory issues, but seems to be no change from prior and is following with neuro.  [TextBox_2] : yes

## 2023-01-20 NOTE — HEALTH RISK ASSESSMENT
Pt continues with IV antibiotics. Receives pain meds as needed. Free from falls and injuries. Call bell placed in reach. [facility]   [Good] : ~his/her~  mood as  good [0] : 2) Feeling down, depressed, or hopeless: Not at all (0) [PHQ-2 Negative - No further assessment needed] : PHQ-2 Negative - No further assessment needed [Patient reported mammogram was normal] : Patient reported mammogram was normal [Patient reported bone density results were abnormal] : Patient reported bone density results were abnormal [None] : None [Retired] : retired [] :  [# Of Children ___] : has [unfilled] children [Feels Safe at Home] : Feels safe at home [Fully functional (bathing, dressing, toileting, transferring, walking, feeding)] : Fully functional (bathing, dressing, toileting, transferring, walking, feeding) [Fully functional (using the telephone, shopping, preparing meals, housekeeping, doing laundry, using] : Fully functional and needs no help or supervision to perform IADLs (using the telephone, shopping, preparing meals, housekeeping, doing laundry, using transportation, managing medications and managing finances) [Any fall with injury in past year] : Patient reported fall with injury in the past year [de-identified] : went to ER and had neg CT head [RBD7Horjg] : 0 [MammogramDate] : 06/22 [BoneDensityDate] : 06/21 [BoneDensityComments] : osteopenia

## 2023-01-23 LAB
25(OH)D3 SERPL-MCNC: 37.1 NG/ML
ALBUMIN SERPL ELPH-MCNC: 4.4 G/DL
ALP BLD-CCNC: 109 U/L
ALT SERPL-CCNC: 19 U/L
ANION GAP SERPL CALC-SCNC: 15 MMOL/L
AST SERPL-CCNC: 27 U/L
BASOPHILS # BLD AUTO: 0.08 K/UL
BASOPHILS NFR BLD AUTO: 1 %
BILIRUB SERPL-MCNC: 2.1 MG/DL
BUN SERPL-MCNC: 17 MG/DL
CALCIUM SERPL-MCNC: 9.9 MG/DL
CHLORIDE SERPL-SCNC: 101 MMOL/L
CHOLEST SERPL-MCNC: 162 MG/DL
CO2 SERPL-SCNC: 26 MMOL/L
CREAT SERPL-MCNC: 0.89 MG/DL
EGFR: 62 ML/MIN/1.73M2
EOSINOPHIL # BLD AUTO: 0.2 K/UL
EOSINOPHIL NFR BLD AUTO: 2.6 %
ESTIMATED AVERAGE GLUCOSE: 126 MG/DL
GLUCOSE SERPL-MCNC: 110 MG/DL
HBA1C MFR BLD HPLC: 6 %
HCT VFR BLD CALC: 49.7 %
HDLC SERPL-MCNC: 42 MG/DL
HGB BLD-MCNC: 16.4 G/DL
IMM GRANULOCYTES NFR BLD AUTO: 0.4 %
LDLC SERPL CALC-MCNC: 84 MG/DL
LYMPHOCYTES # BLD AUTO: 1.63 K/UL
LYMPHOCYTES NFR BLD AUTO: 21.2 %
MAN DIFF?: NORMAL
MCHC RBC-ENTMCNC: 31.7 PG
MCHC RBC-ENTMCNC: 33 GM/DL
MCV RBC AUTO: 96.1 FL
MONOCYTES # BLD AUTO: 0.68 K/UL
MONOCYTES NFR BLD AUTO: 8.9 %
NEUTROPHILS # BLD AUTO: 5.06 K/UL
NEUTROPHILS NFR BLD AUTO: 65.9 %
NONHDLC SERPL-MCNC: 120 MG/DL
PLATELET # BLD AUTO: 155 K/UL
POTASSIUM SERPL-SCNC: 4.3 MMOL/L
PROT SERPL-MCNC: 6.7 G/DL
RBC # BLD: 5.17 M/UL
RBC # FLD: 13.5 %
SODIUM SERPL-SCNC: 141 MMOL/L
T4 FREE SERPL-MCNC: 1.2 NG/DL
TRIGL SERPL-MCNC: 176 MG/DL
TSH SERPL-ACNC: 4.79 UIU/ML
WBC # FLD AUTO: 7.68 K/UL

## 2023-01-24 LAB — THYROGLOB AB SERPL IA-ACNC: <1.8 IU/ML

## 2023-04-18 ENCOUNTER — APPOINTMENT (OUTPATIENT)
Dept: ENDOCRINOLOGY | Facility: CLINIC | Age: 88
End: 2023-04-18
Payer: MEDICARE

## 2023-04-18 ENCOUNTER — LABORATORY RESULT (OUTPATIENT)
Age: 88
End: 2023-04-18

## 2023-04-18 VITALS
DIASTOLIC BLOOD PRESSURE: 78 MMHG | HEART RATE: 66 BPM | OXYGEN SATURATION: 96 % | SYSTOLIC BLOOD PRESSURE: 118 MMHG | BODY MASS INDEX: 28.53 KG/M2 | HEIGHT: 63 IN | WEIGHT: 161 LBS

## 2023-04-18 PROCEDURE — 99214 OFFICE O/P EST MOD 30 MIN: CPT

## 2023-04-21 NOTE — HISTORY OF PRESENT ILLNESS
[FreeTextEntry1] : Apr 18, 2023    in person     accompanied by her    \par \par PCP:  Dr. Garrett Chamberlain   \par         Gyn:  Dr. Lawrence Mendelowitz\par         Card:  Dr. Jordan Robb \par         ?Neuro:   Dr. Logan  in [place]     \par \par CC:   R thyroid nodule\par         Elevated TSH\par \par 89 yo mother of three visits to follow up on dominant R sided thyroid nodule. \par Cytology report from FNAB:  [place] VI:  papillary thyroid cancer.\par Neither patient nor  are exited about surgical option.\par \par On LT4 25 mcg alt with 50\par \par Plan:  50 mcg daily to keep TSH WNL so as not to stimulate the thyroid cancer.\par She would prefer labs today rather than in July\par \par ROV in November.    after [place] US \par \par \par \par Nov 11, 2022     in person     accompanied by her  in [facility]\par \par PCP:  Dr. Garrett Chamberlain   \par         Gyn:  Dr. Lawrence Mendelowitz\par         Card:  Dr. Jordan Robb \par \par CC:   R thyroid nodule\par         Elevated TSH\par \par 89 yo mother of three visits to follow up on dominant R sided thyroid nodule. \par Cytology report from FNAB:  [place] VI:  papillary thyroid cancer.\par Neither patient nor  are exited about surgical option.\par \par : :\par Constitutional:  Alert, well nourished, healthy appearance, no acute distress \par Eyes:  No proptosis, no stare\par Thyroid:  fullness R thyroid lobe\par Pulmonary:  No respiratory distress, no accessory muscle use; normal rate and effort\par Cardiac:  Normal rate\par Vascular: \par Endocrine:  No stigmata of Cushing’s Syndrome\par Musculoskeletal:  Normal gait, no involuntary movements\par Neurology:  No tremors\par Affect/Mood/Psych:  Oriented x 3; normal affect, normal insight/judgement, normal mood \par .\par \par IPlan:  After last visit, started on levothyroxine, striving to normalize elevated TSH so as to not stimulate growth of the thyroid nodule   She will have TFTs today and go for f/u US thyroid Monday after Thanksgiving, call me a week later to go over labs and US report and ROV in April 2023.\par \par \par \par \par Aug 03, 2022     telephonic\par PCP:  Dr. Garrett Chamberlain   \par         Gyn:  Dr. Lawrence Mendelowitz\par         Card:  Dr. Jordan Robb \par \par CC:   R thyroid nodule\par         Elevated TSH\par \par Cytology report from FNAB:  [place] VI:  papillary thyroid cancer.\par \par Imp/Plan:  Results discussed with patient and .\par She is agreeable to surveillance for now with follow up US thyorid and TFTs prior to follow up visit in January - after starting levothyrxoine 25 mcg daily.  \par \par \par \par Jul 11, 2022    \par \par PCP:  Dr. Garrett Chamberlain   \par         Gyn:  Dr. Lawrence Mendelowitz\par         Card:  Dr. Jordan Robb \par \par CC:   R thyroid nodule\par \par \par 89 yo noted to have a R sided thyroid nodule.\par Went for ultrasound:\par \par \par "CLINICAL INFORMATION: 89-year-old female with thyroid nodule\par \par COMPARISON: None available.\par \par TECHNIQUE: Sonography of the thyroid. \par \par FINDINGS:\par Right Lobe: 3.9 x  3.0 x 1.9 cm. Hypoechoic nodule measuring 1.9 x 2.0 x 1.7 cm.\par \par Left Lobe: 3.4 x 1.2 x 2.7 cm. No discrete nodules.\par \par Isthmus: 3.1 mm.\par \par Cervical Lymph Nodes: No enlarged or abnormal morphology cervical nodes.\par \par \par \par IMPRESSION: \par \par Hypoechoic well-circumscribed right lobe nodule. This nodule is TIRADS TR 5, highly suspicious. Fine needle aspiration is recommended."   as read by Dr. Chan.      \par \par Most recent TFTs include TSH 6..76\par \par : :\par Constitutional:  Alert, well nourished, healthy appearance, no acute distress \par Eyes:  No proptosis, no stare\par Thyroid:\par Pulmonary:  No respiratory distress, no accessory muscle use; normal rate and effort\par Cardiac:  Normal rate\par Vascular: \par Endocrine:  No stigmata of Cushing’s Syndrome\par Musculoskeletal:  Normal gait, no involuntary movements\par Neurology:  No tremors\par Affect/Mood/Psych:  Oriented x 3; normal affect, normal insight/judgement, normal mood \par .\par \par Impression:  Chronic voice hoarseness\par No known FHx of thyroid disease.\par On Elaquis\par \par Plan:  Return to [facility] Radiology for FNAB and ROV here in November.  \par

## 2023-04-23 LAB
T4 SERPL-MCNC: 7.9 UG/DL
THYROGLOB AB SERPL IA-ACNC: <1.8 IU/ML
TSH SERPL-ACNC: 6.22 UIU/ML

## 2023-05-04 ENCOUNTER — RX RENEWAL (OUTPATIENT)
Age: 88
End: 2023-05-04

## 2023-05-19 ENCOUNTER — NON-APPOINTMENT (OUTPATIENT)
Age: 88
End: 2023-05-19

## 2023-05-19 ENCOUNTER — APPOINTMENT (OUTPATIENT)
Dept: CARDIOLOGY | Facility: CLINIC | Age: 88
End: 2023-05-19
Payer: MEDICARE

## 2023-05-19 VITALS
OXYGEN SATURATION: 98 % | SYSTOLIC BLOOD PRESSURE: 160 MMHG | HEART RATE: 70 BPM | DIASTOLIC BLOOD PRESSURE: 80 MMHG | WEIGHT: 161 LBS | BODY MASS INDEX: 28.53 KG/M2 | HEIGHT: 63 IN

## 2023-05-19 PROCEDURE — 93000 ELECTROCARDIOGRAM COMPLETE: CPT

## 2023-05-19 PROCEDURE — 99214 OFFICE O/P EST MOD 30 MIN: CPT

## 2023-05-19 RX ORDER — PRENATAL 168/IRON/FOLIC/OMEGA3 27-800-235
250-107-500 CAPSULE ORAL
Refills: 0 | Status: DISCONTINUED | COMMUNITY
Start: 2022-06-07 | End: 2023-05-19

## 2023-05-19 NOTE — HISTORY OF PRESENT ILLNESS
[FreeTextEntry1] : Patient walks with the aid of a cane.  Although her gait seems slightly shaky she has not suffered any falls.  This is fortunate because she is on anticoagulation with Eliquis due to chronic atrial fibrillation.

## 2023-05-19 NOTE — PHYSICAL EXAM

## 2023-05-19 NOTE — DISCUSSION/SUMMARY
[FreeTextEntry1] : The patient continues to do extremely well clinically\par \par She is treated for chronic atrial fibrillation which has been well-tolerated and asymptomatic.  The patient has been on standard dose Eliquis because her kidney function is normal and her weight is 160 pounds approximately\par \par I have questioned her about the need to avoid falls I said I recommended that she try to use a walker is much as possible rather than just a cane\par \par Blood pressure was slightly elevated today but a second reading was coming down at 150/80\par \par Cardiopulmonary examination unremarkable lower extremities reveal trace edema\par \par The electrocardiogram reveals atrial fibrillation with controlled ventricular rate LVH and nonspecific ST-T wave abnormalities with no acute change from previous tracings\par \par Based on my examination and assessment I believe the patient's cardiovascular status is stable.  I plan to continue the current medical regimen.\par \par We will follow the patient's progress periodically.\par \par Patient does have evidence of moderate pulmonary hypertension on echo as however this has not manifested as clinical shortness of breath or other clinical symptoms.\par \par We will do another echo in 3 months time.

## 2023-05-19 NOTE — CARDIOLOGY SUMMARY
[de-identified] : Echocardiogram June 22, 2022 normal LV function estimated EF 65% normal RV function dilated LA dilated RA very small pericardial effusion moderate mitral regurg moderate to severe tricuspid regurg elevated PA pressure 55.  The findings were similar to January 2020.

## 2023-05-19 NOTE — REASON FOR VISIT
[FreeTextEntry1] : Returns for follow-up today\par \par She is followed with the principal diagnosis of chronic atrial fibrillation\par \par The patient's cardiac status has been stable over the past 2 years\par \par There have been no symptoms of congestive heart failure coronary ischemia or palpitations.  Her heart rate has been very well controlled with medications there is a tendency to slight lower extremity edema.

## 2023-05-19 NOTE — REVIEW OF SYSTEMS
[Negative] : Heme/Lymph [FreeTextEntry9] : Patient complains of some numbness at the distal fingertips. [FreeTextEntry1] : The patient is followed by Dr. Hellerman of endocrinology because of a right-sided thyroid nodule cytology report revealed papillary thyroid cancer the patient has declined surgical intervention she is receiving levothyroxine therapy.

## 2023-07-06 ENCOUNTER — RX RENEWAL (OUTPATIENT)
Age: 88
End: 2023-07-06

## 2023-08-14 ENCOUNTER — APPOINTMENT (OUTPATIENT)
Dept: CARDIOLOGY | Facility: CLINIC | Age: 88
End: 2023-08-14

## 2023-08-16 ENCOUNTER — RX RENEWAL (OUTPATIENT)
Age: 88
End: 2023-08-16

## 2023-08-16 RX ORDER — ATENOLOL 25 MG/1
25 TABLET ORAL
Qty: 180 | Refills: 3 | Status: ACTIVE | COMMUNITY
Start: 2019-04-11 | End: 1900-01-01

## 2023-09-19 ENCOUNTER — APPOINTMENT (OUTPATIENT)
Dept: INTERNAL MEDICINE | Facility: CLINIC | Age: 88
End: 2023-09-19
Payer: COMMERCIAL

## 2023-09-19 VITALS
WEIGHT: 162 LBS | OXYGEN SATURATION: 93 % | BODY MASS INDEX: 28.7 KG/M2 | SYSTOLIC BLOOD PRESSURE: 134 MMHG | HEART RATE: 80 BPM | HEIGHT: 63 IN | DIASTOLIC BLOOD PRESSURE: 70 MMHG

## 2023-09-19 DIAGNOSIS — Z23 ENCOUNTER FOR IMMUNIZATION: ICD-10-CM

## 2023-09-19 DIAGNOSIS — M85.80 OTHER SPECIFIED DISORDERS OF BONE DENSITY AND STRUCTURE, UNSPECIFIED SITE: ICD-10-CM

## 2023-09-19 PROCEDURE — 90662 IIV NO PRSV INCREASED AG IM: CPT

## 2023-09-19 PROCEDURE — G0008: CPT

## 2023-09-19 PROCEDURE — 99214 OFFICE O/P EST MOD 30 MIN: CPT | Mod: 25

## 2023-09-20 PROBLEM — M85.80 OSTEOPENIA, UNSPECIFIED LOCATION: Status: ACTIVE | Noted: 2018-11-26

## 2023-09-20 PROBLEM — Z23 ENCOUNTER FOR IMMUNIZATION: Status: ACTIVE | Noted: 2020-09-23 | Resolved: 2023-10-04

## 2023-10-04 ENCOUNTER — RESULT REVIEW (OUTPATIENT)
Age: 88
End: 2023-10-04

## 2023-11-06 ENCOUNTER — RX RENEWAL (OUTPATIENT)
Age: 88
End: 2023-11-06

## 2024-01-04 ENCOUNTER — NON-APPOINTMENT (OUTPATIENT)
Age: 89
End: 2024-01-04

## 2024-01-04 ENCOUNTER — APPOINTMENT (OUTPATIENT)
Dept: HEART AND VASCULAR | Facility: CLINIC | Age: 89
End: 2024-01-04
Payer: MEDICARE

## 2024-01-04 VITALS
OXYGEN SATURATION: 96 % | HEART RATE: 63 BPM | WEIGHT: 156 LBS | SYSTOLIC BLOOD PRESSURE: 128 MMHG | BODY MASS INDEX: 27.63 KG/M2 | DIASTOLIC BLOOD PRESSURE: 78 MMHG

## 2024-01-04 DIAGNOSIS — I07.1 RHEUMATIC TRICUSPID INSUFFICIENCY: ICD-10-CM

## 2024-01-04 PROCEDURE — 99214 OFFICE O/P EST MOD 30 MIN: CPT

## 2024-01-04 PROCEDURE — 93000 ELECTROCARDIOGRAM COMPLETE: CPT

## 2024-01-04 NOTE — PHYSICAL EXAM
[Well Developed] : well developed [Well Nourished] : well nourished [No Acute Distress] : no acute distress [Normal Conjunctiva] : normal conjunctiva [Normal Venous Pressure] : normal venous pressure [No Carotid Bruit] : no carotid bruit [Normal S1, S2] : normal S1, S2 [No Murmur] : no murmur [No Rub] : no rub [No Gallop] : no gallop [Clear Lung Fields] : clear lung fields [Good Air Entry] : good air entry [No Respiratory Distress] : no respiratory distress  [Soft] : abdomen soft [Non Tender] : non-tender [No Masses/organomegaly] : no masses/organomegaly [Normal Bowel Sounds] : normal bowel sounds [Moves all extremities] : moves all extremities [No Focal Deficits] : no focal deficits [Normal Speech] : normal speech [No ulcers] : no ulcers [No varicosities] : no varicosities [No chronic venous stasis changes] : no chronic venous stasis changes [No cyanosis] : no cyanosis [No rashes] : no rashes [Normal peripheral pulses] : : normal peripheral pulses [de-identified] : irregular [de-identified] : trace edema distal LLE

## 2024-01-04 NOTE — ASSESSMENT
[FreeTextEntry1] : 89F patient of Dr. Robb.  Hx of afib on eliquis, digoxin, atenolol. pulm htn, mod/severe TR, mod AI and MR.    EKG today unchanged afib, lateral ST-T abnormality likely strain pattern  - update ECHO, last done June 2022, she has significant valvular disease and pulmonary hypertension, normal LV and RV function.  Appears well compensated.  - continue current medical regimen, has had no bleeding on full dose eliquis.  No hx CVA. - return in 6 months, sooner if new abnormalities on imaging

## 2024-01-04 NOTE — CARDIOLOGY SUMMARY
[de-identified] : Echo: Echocardiogram June 22, 2022 normal LV function estimated EF 65% normal RV function dilated LA dilated RA very small pericardial effusion moderate mitral regurg moderate to severe tricuspid regurg elevated PA pressure 55. The findings were similar to January 2020.

## 2024-01-04 NOTE — HISTORY OF PRESENT ILLNESS
[FreeTextEntry1] : 89 y/o F patient of Dr. Robb.  Hx of afib on eliquis, digoxin, atenolol. pulm htn, mod/severe TR, mod AI and MR.    Feeling well overall.  Occasionally feels palpitations.  Has chronic pedal edema in left leg.   No cp or sob.  No falls.  No syncope.   No dizziness.  No bleeding.

## 2024-01-08 ENCOUNTER — NON-APPOINTMENT (OUTPATIENT)
Age: 89
End: 2024-01-08

## 2024-01-09 ENCOUNTER — APPOINTMENT (OUTPATIENT)
Dept: ENDOCRINOLOGY | Facility: CLINIC | Age: 89
End: 2024-01-09

## 2024-01-12 ENCOUNTER — RX RENEWAL (OUTPATIENT)
Age: 89
End: 2024-01-12

## 2024-01-17 ENCOUNTER — APPOINTMENT (OUTPATIENT)
Dept: CARDIOLOGY | Facility: CLINIC | Age: 89
End: 2024-01-17

## 2024-01-24 NOTE — PHYSICAL EXAM
[Awake] : awake [Alert] : alert [Acute Distress] : no acute distress [Thyroid Nodule] : thyroid nodule [Mass] : no breast mass [Nipple Discharge] : no nipple discharge [Axillary LAD] : no axillary lymphadenopathy [Soft] : soft [Tender] : non tender [Oriented x3] : oriented to person, place, and time [Vulvar Atrophy] : vulvar atrophy [Normal] : urethra [Atrophy] : atrophy [No Bleeding] : there was no active vaginal bleeding [Absent] : was absent [Uterine Adnexae] : were not tender and not enlarged [Nl Sphincter Tone] : normal sphincter tone [Ovarian Mass (___ Cm)] : there were no adnexal masses [FreeTextEntry9] : no masses

## 2024-01-24 NOTE — HISTORY OF PRESENT ILLNESS
[TextBox_4] : Feels well.  - 3 sons. KATIE at ~40; tubes & ovaries removed. Husb - chemorx for ca on R side of neck - ?lymphoma Had Covid vaccine x3. 21 bone density: T -0.7 at L2-3; hip -1.1; femoral neck -1.5 (Z +1.1). Osteopenia. Comparison is made to a baseline of  and most recent previous of 2019 and reveals statistically significant increase in bone mineral density of 5.2% in the spine and no statistically significant change in the hip compared to the most recent previous in 2019. Ten-year fracture risk: 11 and 3.1%. Incidental note is made of a 2021 vitamin D 38.0. Patient had been on Fosamax since  & stopped in late 2018; this was her 2nd course of Fos. [Mammogramdate] : 6/3/2022 [TextBox_19] : Heterogeneously dense; negative [BreastSonogramDate] : 6/3/2022 [TextBox_25] : Neg. [BoneDensityDate] : 3/19/19 [TextBox_37] : T. -1.2 at L2-3; left hip -1.1; left femoral neck -1.5. As compared to the patient's baseline study in 8/18/00, no statistically significant change is noted at the lumbar spine with a statistically significant interval increase in bone density at the left hip. No statistically significant change in bone density at either the spine or left hip since the most recent study dated 2/28/17. 10 year fracture risk is 12 and 3.3%.

## 2024-01-25 ENCOUNTER — APPOINTMENT (OUTPATIENT)
Dept: OBGYN | Facility: CLINIC | Age: 89
End: 2024-01-25
Payer: MEDICARE

## 2024-01-25 DIAGNOSIS — Z01.419 ENCOUNTER FOR GYNECOLOGICAL EXAMINATION (GENERAL) (ROUTINE) W/OUT ABNORMAL FINDINGS: ICD-10-CM

## 2024-01-25 DIAGNOSIS — Z12.31 ENCOUNTER FOR SCREENING MAMMOGRAM FOR MALIGNANT NEOPLASM OF BREAST: ICD-10-CM

## 2024-01-25 DIAGNOSIS — R92.30 DENSE BREASTS, UNSPECIFIED: ICD-10-CM

## 2024-01-25 DIAGNOSIS — R10.2 PELVIC AND PERINEAL PAIN: ICD-10-CM

## 2024-01-25 PROCEDURE — 99214 OFFICE O/P EST MOD 30 MIN: CPT

## 2024-01-25 PROCEDURE — 76830 TRANSVAGINAL US NON-OB: CPT

## 2024-02-06 ENCOUNTER — RESULT REVIEW (OUTPATIENT)
Age: 89
End: 2024-02-06

## 2024-02-07 ENCOUNTER — LABORATORY RESULT (OUTPATIENT)
Age: 89
End: 2024-02-07

## 2024-02-07 ENCOUNTER — APPOINTMENT (OUTPATIENT)
Dept: INTERNAL MEDICINE | Facility: CLINIC | Age: 89
End: 2024-02-07
Payer: MEDICARE

## 2024-02-07 VITALS
OXYGEN SATURATION: 93 % | DIASTOLIC BLOOD PRESSURE: 74 MMHG | WEIGHT: 160 LBS | BODY MASS INDEX: 28.35 KG/M2 | HEIGHT: 63 IN | TEMPERATURE: 98.2 F | SYSTOLIC BLOOD PRESSURE: 138 MMHG | HEART RATE: 64 BPM

## 2024-02-07 DIAGNOSIS — R10.30 LOWER ABDOMINAL PAIN, UNSPECIFIED: ICD-10-CM

## 2024-02-07 DIAGNOSIS — I27.20 PULMONARY HYPERTENSION, UNSPECIFIED: ICD-10-CM

## 2024-02-07 DIAGNOSIS — E04.1 NONTOXIC SINGLE THYROID NODULE: ICD-10-CM

## 2024-02-07 PROCEDURE — 99214 OFFICE O/P EST MOD 30 MIN: CPT

## 2024-02-07 PROCEDURE — 36415 COLL VENOUS BLD VENIPUNCTURE: CPT

## 2024-02-07 NOTE — REVIEW OF SYSTEMS
[Fever] : no fever [Abdominal Pain] : abdominal pain [Nausea] : no nausea [Constipation] : no constipation [Vomiting] : no vomiting [Heartburn] : no heartburn [Melena] : no melena [Negative] : Respiratory [FreeTextEntry7] : very loose stool, non bloody

## 2024-02-07 NOTE — HISTORY OF PRESENT ILLNESS
[FreeTextEntry8] : Pt here for visit  She saw Dr Mendelowitz on 1/25/24.  Pt says that intermittently she gets a very sharp abdominal pain and then has a bowel movement shortly after. Then the pain is relieved. She has not had an episode of it this week.  The pain is uncomfortable. She can tolerate it, but it is occurring repeatedly and she is worried about it.  She does not think its anything that she has been eating. No fever. No constipation. No weight loss.

## 2024-02-07 NOTE — HEALTH RISK ASSESSMENT
[No falls in past year] : Patient reported no falls in the past year [0] : 2) Feeling down, depressed, or hopeless: Not at all (0) [PHQ-2 Negative - No further assessment needed] : PHQ-2 Negative - No further assessment needed [[id number]] : 0 [Never] : Never

## 2024-02-07 NOTE — PHYSICAL EXAM
[No JVD] : no jugular venous distention [Normal] : normal rate, regular rhythm, normal S1 and S2 and no murmur heard [Soft] : abdomen soft [Non Tender] : non-tender [No Masses] : no abdominal mass palpated [No HSM] : no HSM [Normal Bowel Sounds] : normal bowel sounds [Normal Posterior Cervical Nodes] : no posterior cervical lymphadenopathy [Normal Anterior Cervical Nodes] : no anterior cervical lymphadenopathy [No Focal Deficits] : no focal deficits [Normal Affect] : the affect was normal [de-identified] : slight distension

## 2024-02-08 LAB
ALBUMIN SERPL ELPH-MCNC: 4.4 G/DL
ALP BLD-CCNC: 105 U/L
ALT SERPL-CCNC: 20 U/L
AMYLASE/CREAT SERPL: 56 U/L
ANION GAP SERPL CALC-SCNC: 13 MMOL/L
AST SERPL-CCNC: 27 U/L
BILIRUB SERPL-MCNC: 1.9 MG/DL
BUN SERPL-MCNC: 13 MG/DL
CALCIUM SERPL-MCNC: 9.7 MG/DL
CHLORIDE SERPL-SCNC: 105 MMOL/L
CO2 SERPL-SCNC: 26 MMOL/L
CREAT SERPL-MCNC: 0.84 MG/DL
EGFR: 66 ML/MIN/1.73M2
GGT SERPL-CCNC: 66 U/L
GLUCOSE SERPL-MCNC: 110 MG/DL
HCT VFR BLD CALC: 45.1 %
HGB BLD-MCNC: 14.6 G/DL
LPL SERPL-CCNC: 19 U/L
MCHC RBC-ENTMCNC: 31.3 PG
MCHC RBC-ENTMCNC: 32.4 GM/DL
MCV RBC AUTO: 96.8 FL
PLATELET # BLD AUTO: 123 K/UL
POTASSIUM SERPL-SCNC: 4.2 MMOL/L
PROT SERPL-MCNC: 6.4 G/DL
RBC # BLD: 4.66 M/UL
RBC # FLD: 14.2 %
SODIUM SERPL-SCNC: 143 MMOL/L
T4 FREE SERPL-MCNC: 1.1 NG/DL
TSH SERPL-ACNC: 4.63 UIU/ML
WBC # FLD AUTO: 6.79 K/UL

## 2024-02-12 LAB
THYROGLOB AB SERPL IA-ACNC: <1.8 IU/ML
TSI ACT/NOR SER: <0.1 IU/L

## 2024-02-15 ENCOUNTER — RESULT REVIEW (OUTPATIENT)
Age: 89
End: 2024-02-15

## 2024-02-23 ENCOUNTER — RESULT REVIEW (OUTPATIENT)
Age: 89
End: 2024-02-23

## 2024-03-11 ENCOUNTER — APPOINTMENT (OUTPATIENT)
Dept: CARDIOLOGY | Facility: CLINIC | Age: 89
End: 2024-03-11
Payer: MEDICARE

## 2024-03-11 PROCEDURE — 93306 TTE W/DOPPLER COMPLETE: CPT

## 2024-03-25 ENCOUNTER — RX RENEWAL (OUTPATIENT)
Age: 89
End: 2024-03-25

## 2024-03-25 RX ORDER — HYDROCHLOROTHIAZIDE 25 MG/1
25 TABLET ORAL DAILY
Qty: 90 | Refills: 3 | Status: ACTIVE | COMMUNITY
Start: 2019-12-31 | End: 1900-01-01

## 2024-04-09 ENCOUNTER — APPOINTMENT (OUTPATIENT)
Dept: INTERNAL MEDICINE | Facility: CLINIC | Age: 89
End: 2024-04-09
Payer: MEDICARE

## 2024-04-09 VITALS
SYSTOLIC BLOOD PRESSURE: 126 MMHG | DIASTOLIC BLOOD PRESSURE: 74 MMHG | HEART RATE: 66 BPM | OXYGEN SATURATION: 94 % | HEIGHT: 63 IN

## 2024-04-09 DIAGNOSIS — J30.2 OTHER SEASONAL ALLERGIC RHINITIS: ICD-10-CM

## 2024-04-09 PROCEDURE — 99213 OFFICE O/P EST LOW 20 MIN: CPT

## 2024-04-09 NOTE — REVIEW OF SYSTEMS
[Cough] : cough [Negative] : Heme/Lymph [Shortness Of Breath] : no shortness of breath [Wheezing] : no wheezing [Dyspnea on Exertion] : no dyspnea on exertion [FreeTextEntry6] : mild cough

## 2024-04-09 NOTE — PHYSICAL EXAM
[No Respiratory Distress] : no respiratory distress  [No Accessory Muscle Use] : no accessory muscle use [Clear to Auscultation] : lungs were clear to auscultation bilaterally [Normal Rate] : normal rate  [Regular Rhythm] : with a regular rhythm [Normal S1, S2] : normal S1 and S2 [No Focal Deficits] : no focal deficits [Normal Affect] : the affect was normal [Normal Insight/Judgement] : insight and judgment were intact [No Acute Distress] : no acute distress [Well Nourished] : well nourished [Well Developed] : well developed [Well-Appearing] : well-appearing [Speech Grossly Normal] : speech grossly normal [de-identified] : no brusing noted on buttock or hip region.  [de-identified] : walks with cane, shuffles slowly

## 2024-04-09 NOTE — HEALTH RISK ASSESSMENT
[No] : In the past 12 months have you used drugs other than those required for medical reasons? No [One fall no injury in past year] : Patient reported one fall in the past year without injury [Assistive Device] : Patient uses an assistive device [0] : 2) Feeling down, depressed, or hopeless: Not at all (0) [Never] : Never [de-identified] : none [de-identified] : regular diet  [de-identified] : cane  [[id number]] : 0

## 2024-04-22 ENCOUNTER — RX RENEWAL (OUTPATIENT)
Age: 89
End: 2024-04-22

## 2024-04-22 RX ORDER — DIGOXIN 125 UG/1
125 TABLET ORAL DAILY
Qty: 90 | Refills: 1 | Status: ACTIVE | COMMUNITY
Start: 2019-11-04 | End: 1900-01-01

## 2024-04-22 RX ORDER — LEVOTHYROXINE SODIUM 0.03 MG/1
25 TABLET ORAL
Qty: 60 | Refills: 3 | Status: ACTIVE | COMMUNITY
Start: 2022-08-03 | End: 1900-01-01

## 2024-04-23 ENCOUNTER — RX RENEWAL (OUTPATIENT)
Age: 89
End: 2024-04-23

## 2024-04-23 RX ORDER — APIXABAN 5 MG/1
5 TABLET, FILM COATED ORAL
Qty: 60 | Refills: 5 | Status: ACTIVE | COMMUNITY
Start: 2019-12-09 | End: 1900-01-01

## 2024-05-10 ENCOUNTER — APPOINTMENT (OUTPATIENT)
Dept: INTERNAL MEDICINE | Facility: CLINIC | Age: 89
End: 2024-05-10
Payer: MEDICARE

## 2024-05-10 VITALS
HEART RATE: 68 BPM | OXYGEN SATURATION: 96 % | WEIGHT: 158 LBS | DIASTOLIC BLOOD PRESSURE: 78 MMHG | SYSTOLIC BLOOD PRESSURE: 128 MMHG | BODY MASS INDEX: 28 KG/M2 | HEIGHT: 63 IN

## 2024-05-10 DIAGNOSIS — E78.1 PURE HYPERGLYCERIDEMIA: ICD-10-CM

## 2024-05-10 DIAGNOSIS — R73.03 PREDIABETES.: ICD-10-CM

## 2024-05-10 DIAGNOSIS — G30.9 ALZHEIMER'S DISEASE, UNSPECIFIED: ICD-10-CM

## 2024-05-10 DIAGNOSIS — R74.8 ABNORMAL LEVELS OF OTHER SERUM ENZYMES: ICD-10-CM

## 2024-05-10 DIAGNOSIS — F02.80 ALZHEIMER'S DISEASE, UNSPECIFIED: ICD-10-CM

## 2024-05-10 DIAGNOSIS — C73 MALIGNANT NEOPLASM OF THYROID GLAND: ICD-10-CM

## 2024-05-10 PROCEDURE — G0439: CPT

## 2024-05-10 PROCEDURE — 99213 OFFICE O/P EST LOW 20 MIN: CPT | Mod: 25

## 2024-05-10 PROCEDURE — 36415 COLL VENOUS BLD VENIPUNCTURE: CPT

## 2024-05-10 NOTE — PHYSICAL EXAM
[No Acute Distress] : no acute distress [Well Nourished] : well nourished [Well Developed] : well developed [Well-Appearing] : well-appearing [Normal Sclera/Conjunctiva] : normal sclera/conjunctiva [Normal Outer Ear/Nose] : the outer ears and nose were normal in appearance [No JVD] : no jugular venous distention [Supple] : supple [No Respiratory Distress] : no respiratory distress  [No Accessory Muscle Use] : no accessory muscle use [Clear to Auscultation] : lungs were clear to auscultation bilaterally [Normal Rate] : normal rate  [Normal S1, S2] : normal S1 and S2 [No Edema] : there was no peripheral edema [Soft] : abdomen soft [Non Tender] : non-tender [Non-distended] : non-distended [Normal Bowel Sounds] : normal bowel sounds [Normal Posterior Cervical Nodes] : no posterior cervical lymphadenopathy [Normal Anterior Cervical Nodes] : no anterior cervical lymphadenopathy [No CVA Tenderness] : no CVA  tenderness [No Spinal Tenderness] : no spinal tenderness [No Joint Swelling] : no joint swelling [Grossly Normal Strength/Tone] : grossly normal strength/tone [No Rash] : no rash [Coordination Grossly Intact] : coordination grossly intact [No Focal Deficits] : no focal deficits [Normal Gait] : normal gait [Speech Grossly Normal] : speech grossly normal [Normal Affect] : the affect was normal [Normal Mood] : the mood was normal [Normal Insight/Judgement] : insight and judgment were intact [de-identified] : irreg at present. [de-identified] : no swelling at present [de-identified] : walks with cane [de-identified] : AA0 x 3, knows that it is May 10 , but cannot give me the year. Knows her address, knows she is in [place]. birth date, current events, etc.

## 2024-05-10 NOTE — HEALTH RISK ASSESSMENT
[Good] : ~his/her~  mood as  good [No] : No [No falls in past year] : Patient reported no falls in the past year [0] : 2) Feeling down, depressed, or hopeless: Not at all (0) [PHQ-2 Negative - No further assessment needed] : PHQ-2 Negative - No further assessment needed [[id number]] : 0 [Patient reported mammogram was normal] : Patient reported mammogram was normal [Patient declined bone density test] : Patient declined bone density test [Patient declined colonoscopy] : Patient declined colonoscopy [HIV test declined] : HIV test declined [Hepatitis C test declined] : Hepatitis C test declined [None] : None [With Significant Other] : lives with significant other [# of Members in Household ___] :  household currently consist of [unfilled] member(s) [Retired] : retired [] :  [# Of Children ___] : has [unfilled] children [Fully functional (bathing, dressing, toileting, transferring, walking, feeding)] : Fully functional (bathing, dressing, toileting, transferring, walking, feeding) [Feels Safe at Home] : Feels safe at home [Fully functional (using the telephone, shopping, preparing meals, housekeeping, doing laundry, using] : Fully functional and needs no help or supervision to perform IADLs (using the telephone, shopping, preparing meals, housekeeping, doing laundry, using transportation, managing medications and managing finances) [MammogramDate] : 02/24 [BoneDensityDate] : 06/21 [BoneDensityComments] : osteopenia [Never] : Never

## 2024-05-10 NOTE — COUNSELING
[Fall prevention counseling provided] : Fall prevention counseling provided [Adequate lighting] : Adequate lighting [No throw rugs] : No throw rugs [Use proper foot wear] : Use proper foot wear [Use recommended devices] : Use recommended devices [FreeTextEntry1] : seen ambulating with cane.

## 2024-05-10 NOTE — HISTORY OF PRESENT ILLNESS
[PMH Reviewed and Updated] : past medical history reviewed and updated [PSH Reviewed and Updated] : past surgical history reviewed and updated [Family History Reviewed and Updated] : family history reviewed and updated [Medication and Allergies Reconciled] : medication and allergies reconciled [0] : 0 [Retired] : retired from work [Never] : has never used illicit drugs [None] : The patient does not exercise [Compliant with medications] : compliant with medications [Spouse] : spouse [Children] : children [Extended Family] : extended family [Needs some help with ADLs] : need some help with ADLs [Does not drive] : does not drive [No history of falls] : no history of falls [Seatbelts] : seatbelts [Smoke Detectors] : smoke detectors [Carbon Monoxide Detector] : carbon monoxide detector [Hot Water Temp <120F] : hot water temp <120F [Bathroom Grab Bars] : bathroom grab bars [Fall Prevention Measures] : fall prevention measures [Patient Has Full Capacity] : this patient has full decision making capacity for discussion of advance care planning [Patient Has Designated Health Care Proxy/Advanced Directive] : patient has designated Health Care Proxy/Advanced Directive [FreeTextEntry1] : annual wellness. [Over the Past 2 Weeks, Have You Felt Down, Depressed, or Hopeless?] : 1.) Over the past 2 weeks, have you felt down, depressed, or hopeless? No [Over the Past 2 Weeks, Have You Felt Little Interest or Pleasure Doing Things?] : 2.) Over the past 2 weeks, have you felt little interest or pleasure doing things? No [Bicycle Helmet] : not using bicycle helmet [Motorcycle Helmet] : not using motorcycle helmet [Safe Driving Habits] : not using safe driving habits [Gun Trigger Locks] : not using gun trigger locks [Gun Safe] : not using a gun safe [CPR Training for Household] : did not receive CPR training for patient [Sunscreen] : not using sunscreen [CPR Training for Patient] : did not receive CPR training for patient [FreeTextEntry2] : nonw [de-identified] : none [de-identified] : Pt here for Medicare annual wellness. States she feels well today.  Her abdominal pain is improved. She had CT scan and GB abdomen done that were unrevealing. She has no cardiac complaints at present. Recently had an echo done and she will be following with Dr Patterson now that Dr Robb has retired. ( Mod AR and Mod to severe TR). No complaints of SOB. She walks with her cane, but overall does not get much physical activity. She has had flu vaccine and recent COVID booster.  She takes eliquis and has not had any black or bloody stool. No bruising or bleeding overall.

## 2024-05-11 LAB
ALBUMIN SERPL ELPH-MCNC: 4.1 G/DL
ALP BLD-CCNC: 117 U/L
ALT SERPL-CCNC: 15 U/L
ANION GAP SERPL CALC-SCNC: 17 MMOL/L
AST SERPL-CCNC: 24 U/L
BILIRUB SERPL-MCNC: 1.5 MG/DL
BUN SERPL-MCNC: 18 MG/DL
CALCIUM SERPL-MCNC: 9.6 MG/DL
CHLORIDE SERPL-SCNC: 99 MMOL/L
CHOLEST SERPL-MCNC: 152 MG/DL
CO2 SERPL-SCNC: 22 MMOL/L
CREAT SERPL-MCNC: 0.88 MG/DL
EGFR: 62 ML/MIN/1.73M2
ESTIMATED AVERAGE GLUCOSE: 126 MG/DL
GLUCOSE SERPL-MCNC: 128 MG/DL
HBA1C MFR BLD HPLC: 6 %
HCT VFR BLD CALC: 46.8 %
HDLC SERPL-MCNC: 36 MG/DL
HGB BLD-MCNC: 14.8 G/DL
LDLC SERPL CALC-MCNC: 74 MG/DL
MCHC RBC-ENTMCNC: 30.9 PG
MCHC RBC-ENTMCNC: 31.6 GM/DL
MCV RBC AUTO: 97.7 FL
NONHDLC SERPL-MCNC: 115 MG/DL
PLATELET # BLD AUTO: 157 K/UL
POTASSIUM SERPL-SCNC: 4 MMOL/L
PROT SERPL-MCNC: 6.5 G/DL
RBC # BLD: 4.79 M/UL
RBC # FLD: 13.8 %
SODIUM SERPL-SCNC: 138 MMOL/L
T4 FREE SERPL-MCNC: 1.2 NG/DL
TRIGL SERPL-MCNC: 252 MG/DL
TSH SERPL-ACNC: 4.74 UIU/ML
WBC # FLD AUTO: 7.62 K/UL

## 2024-05-31 ENCOUNTER — NON-APPOINTMENT (OUTPATIENT)
Age: 89
End: 2024-05-31

## 2024-05-31 ENCOUNTER — APPOINTMENT (OUTPATIENT)
Dept: CARDIOLOGY | Facility: CLINIC | Age: 89
End: 2024-05-31
Payer: MEDICARE

## 2024-05-31 VITALS
BODY MASS INDEX: 29.05 KG/M2 | SYSTOLIC BLOOD PRESSURE: 163 MMHG | OXYGEN SATURATION: 96 % | WEIGHT: 164 LBS | DIASTOLIC BLOOD PRESSURE: 77 MMHG | HEART RATE: 69 BPM

## 2024-05-31 DIAGNOSIS — I10 ESSENTIAL (PRIMARY) HYPERTENSION: ICD-10-CM

## 2024-05-31 DIAGNOSIS — E78.5 HYPERLIPIDEMIA, UNSPECIFIED: ICD-10-CM

## 2024-05-31 DIAGNOSIS — I48.91 UNSPECIFIED ATRIAL FIBRILLATION: ICD-10-CM

## 2024-05-31 DIAGNOSIS — I38 ENDOCARDITIS, VALVE UNSPECIFIED: ICD-10-CM

## 2024-05-31 PROCEDURE — 99214 OFFICE O/P EST MOD 30 MIN: CPT

## 2024-05-31 PROCEDURE — 93000 ELECTROCARDIOGRAM COMPLETE: CPT

## 2024-06-01 PROBLEM — I48.91 ATRIAL FIBRILLATION: Status: ACTIVE | Noted: 2020-01-28

## 2024-06-02 PROBLEM — I10 ESSENTIAL HYPERTENSION: Status: ACTIVE | Noted: 2018-11-26

## 2024-06-02 PROBLEM — I38 VALVULAR HEART DISEASE: Status: ACTIVE | Noted: 2024-06-02

## 2024-06-02 PROBLEM — E78.5 HYPERLIPIDEMIA: Status: ACTIVE | Noted: 2024-06-01

## 2024-06-02 NOTE — HISTORY OF PRESENT ILLNESS
[FreeTextEntry1] : 89-year-old female Routine follow-up for atrial fibrillation hypertension hyperlipidemia valvular heart disease Mrs. Abel previously been followed by Dr. Robb "I feel okay.  "Maite denies symptoms of chest pain palpitations shortness of breath or syncope.  Chronic left greater than right lower extremity edema is unchanged from in the past.  No orthopnea.  No paroxysmal nocturnal dyspnea  Mrs. Haque is accompanied today by her

## 2024-06-02 NOTE — PHYSICAL EXAM
[Normal Conjunctiva] : normal conjunctiva [Normal S1, S2] : normal S1, S2 [Clear Lung Fields] : clear lung fields [Soft] : abdomen soft [Non Tender] : non-tender [Normal Bowel Sounds] : normal bowel sounds [Normal Gait] : normal gait [No Rash] : no rash [No Focal Deficits] : no focal deficits [de-identified] : No neck vein distention.  No carotid bruit [de-identified] : Appears in no distress lying flat [de-identified] : Irregular rhythm.  No murmur audible [de-identified] : Full range of motion [de-identified] : +1 left lower extremity trace to +1 right lower extremity edema. [de-identified] : pleasant

## 2024-06-02 NOTE — DISCUSSION/SUMMARY
[FreeTextEntry1] : Atrial fibrillation The working diagnosis is chronic atrial fibrillation secondary to hypertensive  valvular and probable atherosclerotic heart disease.  Adequate rate control of the ventricular response appears to be achieved with the present medical regimen.  The dose of atenolol may be increased if required to maintain optimal rate control.  An elevated [id number] vascular score is indicative of an increased risk of systemic/cerebral emboli.  Factor Xa inhibitor therapy is indicated.  I have recommended the following: Continue the present medical regimen Increase atenolol dose if required to maintain adequate control of the ventricular response as discussed above levels    Valvular heart disease The 3/24 echocardiogram revealed moderate to severe tricuspid moderate aortic and mild-moderate mitral regurgitation.  The right ventricle was mildly enlarged.  Severe pulmonary hypertension was reflected by pulmonary artery systolic pressure of 87 mmHg.  The left ventricular ejection fraction was 60-65%.  The present cardiac physical examination is not suggestive of severe tricuspid regurgitation and is consistent with a euvolemic state New York Heart Association class II-III.  The patient's age and mental status are major influences on management decisions.  I have recommended the following: No further cardiac testing for this problem at this time Anticipate repeat echocardiogram 2025     Hypertension Hypertension is reportedly controlled on the present medical regimen.  The dose of atenolol may be increased if required to maintain optimal levels.  In the setting of atrial fibrillation prediabetes and probable atherosclerotic heart disease beta-blocker and ACE-I/ARB therapy are attractive.  I have recommended the following: Low-fat low-salt low-triglyceride/cholesterol heart healthy diet.  Aerobic exercise to the extent possible Continue the present medical regimen Increase atenolol dose and/or addition of ACE-IARB therapy if required to maintain optimal levels as discussed above.   Hyperlipidemia/elevated triglyceride levels  Maite  has a prior history of hypertriglyceridemia.  In 5/24 the serum triglyceride level was 252 cholesterol 152 HDL 36 and LDL 74.  Apparently Mrs. Abel has not undergone any evaluation for ischemic heart disease.  However given her age and clinical setting undoubtedly some degree of atherosclerotic heart disease is present.  I have recommended the following: Low-fat low triglyceride low-salt heart healthy diet.  Regular aerobic exercise to the extent possible No medical intervention No cardiac testing for this problem     The diagnosis, prognosis, risks, options and alternatives were explained at length to both the patient and her family.  All questions were answered.  Issues discussed included hypertension atrial fibrillation atherosclerotic heart disease hyperlipidemia valvular heart disease noninvasive cardiac testing diet and exercise.  Counseling and/or coordination of care Time was a significant factor for this patient encounter.  Total time spent with the patient and family was 30 minutes.  Greater than 50% of the time was devoted to counseling and/or coordination of care

## 2024-07-12 ENCOUNTER — RX RENEWAL (OUTPATIENT)
Age: 89
End: 2024-07-12

## 2024-07-16 ENCOUNTER — APPOINTMENT (OUTPATIENT)
Dept: HEART AND VASCULAR | Facility: CLINIC | Age: 89
End: 2024-07-16

## 2024-10-01 ENCOUNTER — RX RENEWAL (OUTPATIENT)
Age: 89
End: 2024-10-01

## 2024-10-02 ENCOUNTER — RX RENEWAL (OUTPATIENT)
Age: 89
End: 2024-10-02

## 2024-11-05 ENCOUNTER — RX RENEWAL (OUTPATIENT)
Age: 89
End: 2024-11-05

## 2024-12-02 ENCOUNTER — APPOINTMENT (OUTPATIENT)
Dept: CARDIOLOGY | Facility: CLINIC | Age: 88
End: 2024-12-02

## 2024-12-02 VITALS
WEIGHT: 164 LBS | BODY MASS INDEX: 29.05 KG/M2 | OXYGEN SATURATION: 94 % | DIASTOLIC BLOOD PRESSURE: 62 MMHG | SYSTOLIC BLOOD PRESSURE: 159 MMHG | HEART RATE: 81 BPM

## 2024-12-02 DIAGNOSIS — I48.91 UNSPECIFIED ATRIAL FIBRILLATION: ICD-10-CM

## 2024-12-02 DIAGNOSIS — E78.5 HYPERLIPIDEMIA, UNSPECIFIED: ICD-10-CM

## 2024-12-02 DIAGNOSIS — I07.1 RHEUMATIC TRICUSPID INSUFFICIENCY: ICD-10-CM

## 2024-12-02 DIAGNOSIS — I10 ESSENTIAL (PRIMARY) HYPERTENSION: ICD-10-CM

## 2024-12-02 DIAGNOSIS — E78.1 PURE HYPERGLYCERIDEMIA: ICD-10-CM

## 2024-12-02 DIAGNOSIS — Z86.79 PERSONAL HISTORY OF OTHER DISEASES OF THE CIRCULATORY SYSTEM: ICD-10-CM

## 2024-12-02 DIAGNOSIS — I38 ENDOCARDITIS, VALVE UNSPECIFIED: ICD-10-CM

## 2024-12-02 DIAGNOSIS — Z87.39 PERSONAL HISTORY OF OTHER DISEASES OF THE MUSCULOSKELETAL SYSTEM AND CONNECTIVE TISSUE: ICD-10-CM

## 2024-12-02 PROCEDURE — 99214 OFFICE O/P EST MOD 30 MIN: CPT

## 2024-12-06 ENCOUNTER — RX RENEWAL (OUTPATIENT)
Age: 88
End: 2024-12-06

## 2024-12-10 PROBLEM — Z86.79 HISTORY OF VALVULAR HEART DISEASE: Status: RESOLVED | Noted: 2024-12-10 | Resolved: 2024-12-10

## 2024-12-10 PROBLEM — Z87.39 HISTORY OF OSTEOPENIA: Status: RESOLVED | Noted: 2018-11-26 | Resolved: 2024-12-10

## 2025-01-27 ENCOUNTER — RX RENEWAL (OUTPATIENT)
Age: 89
End: 2025-01-27

## 2025-02-07 ENCOUNTER — RESULT REVIEW (OUTPATIENT)
Age: 89
End: 2025-02-07

## 2025-02-20 ENCOUNTER — RX RENEWAL (OUTPATIENT)
Age: 89
End: 2025-02-20

## 2025-03-20 ENCOUNTER — APPOINTMENT (OUTPATIENT)
Dept: INTERNAL MEDICINE | Facility: CLINIC | Age: 89
End: 2025-03-20

## 2025-03-24 ENCOUNTER — RX RENEWAL (OUTPATIENT)
Age: 89
End: 2025-03-24

## 2025-04-04 ENCOUNTER — LABORATORY RESULT (OUTPATIENT)
Age: 89
End: 2025-04-04

## 2025-04-04 ENCOUNTER — APPOINTMENT (OUTPATIENT)
Dept: INTERNAL MEDICINE | Facility: CLINIC | Age: 89
End: 2025-04-04
Payer: MEDICARE

## 2025-04-04 VITALS
HEART RATE: 76 BPM | HEIGHT: 63 IN | WEIGHT: 169 LBS | RESPIRATION RATE: 16 BRPM | SYSTOLIC BLOOD PRESSURE: 144 MMHG | OXYGEN SATURATION: 96 % | BODY MASS INDEX: 29.95 KG/M2 | DIASTOLIC BLOOD PRESSURE: 70 MMHG | TEMPERATURE: 98.1 F

## 2025-04-04 DIAGNOSIS — I10 ESSENTIAL (PRIMARY) HYPERTENSION: ICD-10-CM

## 2025-04-04 DIAGNOSIS — R53.81 OTHER MALAISE: ICD-10-CM

## 2025-04-04 DIAGNOSIS — F02.80 ALZHEIMER'S DISEASE, UNSPECIFIED: ICD-10-CM

## 2025-04-04 DIAGNOSIS — E04.1 NONTOXIC SINGLE THYROID NODULE: ICD-10-CM

## 2025-04-04 DIAGNOSIS — I48.91 UNSPECIFIED ATRIAL FIBRILLATION: ICD-10-CM

## 2025-04-04 DIAGNOSIS — R26.89 OTHER ABNORMALITIES OF GAIT AND MOBILITY: ICD-10-CM

## 2025-04-04 DIAGNOSIS — G30.9 ALZHEIMER'S DISEASE, UNSPECIFIED: ICD-10-CM

## 2025-04-04 DIAGNOSIS — R73.03 PREDIABETES.: ICD-10-CM

## 2025-04-04 DIAGNOSIS — E78.5 HYPERLIPIDEMIA, UNSPECIFIED: ICD-10-CM

## 2025-04-04 PROCEDURE — 36415 COLL VENOUS BLD VENIPUNCTURE: CPT

## 2025-04-04 PROCEDURE — 99214 OFFICE O/P EST MOD 30 MIN: CPT | Mod: 25

## 2025-04-05 LAB
ALBUMIN SERPL ELPH-MCNC: 4.4 G/DL
ALP BLD-CCNC: 139 U/L
ALT SERPL-CCNC: 21 U/L
ANION GAP SERPL CALC-SCNC: 15 MMOL/L
AST SERPL-CCNC: 29 U/L
BILIRUB SERPL-MCNC: 1.6 MG/DL
BUN SERPL-MCNC: 13 MG/DL
CALCIUM SERPL-MCNC: 9.7 MG/DL
CHLORIDE SERPL-SCNC: 102 MMOL/L
CHOLEST SERPL-MCNC: 150 MG/DL
CO2 SERPL-SCNC: 24 MMOL/L
CREAT SERPL-MCNC: 0.83 MG/DL
EGFRCR SERPLBLD CKD-EPI 2021: 66 ML/MIN/1.73M2
ESTIMATED AVERAGE GLUCOSE: 117 MG/DL
GLUCOSE SERPL-MCNC: 90 MG/DL
HBA1C MFR BLD HPLC: 5.7 %
HCT VFR BLD CALC: 47.5 %
HDLC SERPL-MCNC: 45 MG/DL
HGB BLD-MCNC: 14.9 G/DL
LDLC SERPL-MCNC: 85 MG/DL
MCHC RBC-ENTMCNC: 31.4 G/DL
MCHC RBC-ENTMCNC: 31.5 PG
MCV RBC AUTO: 100.4 FL
NONHDLC SERPL-MCNC: 105 MG/DL
PLATELET # BLD AUTO: 124 K/UL
POTASSIUM SERPL-SCNC: 4.2 MMOL/L
PROT SERPL-MCNC: 6.7 G/DL
RBC # BLD: 4.73 M/UL
RBC # FLD: 14.2 %
SODIUM SERPL-SCNC: 141 MMOL/L
TRIGL SERPL-MCNC: 108 MG/DL
TSH SERPL-ACNC: 7.49 UIU/ML
WBC # FLD AUTO: 6.24 K/UL

## 2025-04-11 ENCOUNTER — APPOINTMENT (OUTPATIENT)
Dept: INTERNAL MEDICINE | Facility: CLINIC | Age: 89
End: 2025-04-11

## 2025-05-15 ENCOUNTER — RX RENEWAL (OUTPATIENT)
Age: 89
End: 2025-05-15

## 2025-05-23 ENCOUNTER — APPOINTMENT (OUTPATIENT)
Dept: CARDIOLOGY | Facility: CLINIC | Age: 89
End: 2025-05-23
Payer: MEDICARE

## 2025-05-23 DIAGNOSIS — I48.91 UNSPECIFIED ATRIAL FIBRILLATION: ICD-10-CM

## 2025-05-23 PROCEDURE — 93306 TTE W/DOPPLER COMPLETE: CPT

## 2025-06-04 ENCOUNTER — APPOINTMENT (OUTPATIENT)
Dept: INTERNAL MEDICINE | Facility: CLINIC | Age: 89
End: 2025-06-04
Payer: MEDICARE

## 2025-06-04 VITALS
BODY MASS INDEX: 28.7 KG/M2 | HEART RATE: 78 BPM | OXYGEN SATURATION: 98 % | DIASTOLIC BLOOD PRESSURE: 70 MMHG | SYSTOLIC BLOOD PRESSURE: 138 MMHG | WEIGHT: 162 LBS | RESPIRATION RATE: 16 BRPM | HEIGHT: 63 IN

## 2025-06-04 DIAGNOSIS — R60.9 EDEMA, UNSPECIFIED: ICD-10-CM

## 2025-06-04 DIAGNOSIS — L84 CORNS AND CALLOSITIES: ICD-10-CM

## 2025-06-04 PROCEDURE — 99213 OFFICE O/P EST LOW 20 MIN: CPT

## 2025-07-14 ENCOUNTER — RX RENEWAL (OUTPATIENT)
Age: 89
End: 2025-07-14

## 2025-08-04 ENCOUNTER — RX RENEWAL (OUTPATIENT)
Age: 89
End: 2025-08-04

## 2025-08-20 ENCOUNTER — APPOINTMENT (OUTPATIENT)
Dept: CARDIOLOGY | Facility: CLINIC | Age: 89
End: 2025-08-20
Payer: MEDICARE

## 2025-08-20 ENCOUNTER — NON-APPOINTMENT (OUTPATIENT)
Age: 89
End: 2025-08-20

## 2025-08-20 VITALS
OXYGEN SATURATION: 94 % | DIASTOLIC BLOOD PRESSURE: 62 MMHG | BODY MASS INDEX: 29.23 KG/M2 | SYSTOLIC BLOOD PRESSURE: 153 MMHG | HEART RATE: 65 BPM | WEIGHT: 165 LBS

## 2025-08-20 DIAGNOSIS — I10 ESSENTIAL (PRIMARY) HYPERTENSION: ICD-10-CM

## 2025-08-20 DIAGNOSIS — E78.1 PURE HYPERGLYCERIDEMIA: ICD-10-CM

## 2025-08-20 DIAGNOSIS — E78.5 HYPERLIPIDEMIA, UNSPECIFIED: ICD-10-CM

## 2025-08-20 DIAGNOSIS — I48.91 UNSPECIFIED ATRIAL FIBRILLATION: ICD-10-CM

## 2025-08-20 DIAGNOSIS — Z87.898 PERSONAL HISTORY OF OTHER SPECIFIED CONDITIONS: ICD-10-CM

## 2025-08-20 DIAGNOSIS — R60.0 LOCALIZED EDEMA: ICD-10-CM

## 2025-08-20 DIAGNOSIS — I38 ENDOCARDITIS, VALVE UNSPECIFIED: ICD-10-CM

## 2025-08-20 DIAGNOSIS — I07.1 RHEUMATIC TRICUSPID INSUFFICIENCY: ICD-10-CM

## 2025-08-20 PROCEDURE — G2211 COMPLEX E/M VISIT ADD ON: CPT

## 2025-08-20 PROCEDURE — 93000 ELECTROCARDIOGRAM COMPLETE: CPT

## 2025-08-20 PROCEDURE — 99214 OFFICE O/P EST MOD 30 MIN: CPT

## 2025-08-20 RX ORDER — FUROSEMIDE 20 MG/1
20 TABLET ORAL DAILY
Qty: 90 | Refills: 3 | Status: ACTIVE | COMMUNITY
Start: 2025-08-20 | End: 1900-01-01

## 2025-08-23 ENCOUNTER — APPOINTMENT (OUTPATIENT)
Dept: AFTER HOURS CARE | Facility: EMERGENCY ROOM | Age: 89
End: 2025-08-23
Payer: MEDICARE

## 2025-08-23 DIAGNOSIS — L03.115 CELLULITIS OF RIGHT LOWER LIMB: ICD-10-CM

## 2025-08-23 PROCEDURE — 99215 OFFICE O/P EST HI 40 MIN: CPT | Mod: NC,2W

## 2025-08-24 PROBLEM — Z87.898 HISTORY OF PERIPHERAL EDEMA: Status: RESOLVED | Noted: 2025-08-20 | Resolved: 2025-08-24

## 2025-08-24 PROBLEM — R60.0 PERIPHERAL EDEMA: Status: ACTIVE | Noted: 2025-08-24

## 2025-09-03 ENCOUNTER — APPOINTMENT (OUTPATIENT)
Dept: CARDIOLOGY | Facility: CLINIC | Age: 89
End: 2025-09-03
Payer: MEDICARE

## 2025-09-03 PROCEDURE — 36415 COLL VENOUS BLD VENIPUNCTURE: CPT

## 2025-09-04 ENCOUNTER — APPOINTMENT (OUTPATIENT)
Dept: INTERNAL MEDICINE | Facility: CLINIC | Age: 89
End: 2025-09-04
Payer: MEDICARE

## 2025-09-04 VITALS
WEIGHT: 165 LBS | HEART RATE: 66 BPM | RESPIRATION RATE: 16 BRPM | DIASTOLIC BLOOD PRESSURE: 60 MMHG | BODY MASS INDEX: 29.23 KG/M2 | HEIGHT: 63 IN | OXYGEN SATURATION: 95 % | SYSTOLIC BLOOD PRESSURE: 132 MMHG | TEMPERATURE: 97.6 F

## 2025-09-04 DIAGNOSIS — R60.0 LOCALIZED EDEMA: ICD-10-CM

## 2025-09-04 DIAGNOSIS — Z85.850 PERSONAL HISTORY OF MALIGNANT NEOPLASM OF THYROID: ICD-10-CM

## 2025-09-04 DIAGNOSIS — R10.2 PELVIC AND PERINEAL PAIN: ICD-10-CM

## 2025-09-04 DIAGNOSIS — Z01.419 ENCOUNTER FOR GYNECOLOGICAL EXAMINATION (GENERAL) (ROUTINE) W/OUT ABNORMAL FINDINGS: ICD-10-CM

## 2025-09-04 DIAGNOSIS — I27.20 PULMONARY HYPERTENSION, UNSPECIFIED: ICD-10-CM

## 2025-09-04 DIAGNOSIS — I10 ESSENTIAL (PRIMARY) HYPERTENSION: ICD-10-CM

## 2025-09-04 DIAGNOSIS — I48.91 UNSPECIFIED ATRIAL FIBRILLATION: ICD-10-CM

## 2025-09-04 DIAGNOSIS — R92.30 DENSE BREASTS, UNSPECIFIED: ICD-10-CM

## 2025-09-04 DIAGNOSIS — Z12.31 ENCOUNTER FOR SCREENING MAMMOGRAM FOR MALIGNANT NEOPLASM OF BREAST: ICD-10-CM

## 2025-09-04 DIAGNOSIS — L84 CORNS AND CALLOSITIES: ICD-10-CM

## 2025-09-04 DIAGNOSIS — R74.8 ABNORMAL LEVELS OF OTHER SERUM ENZYMES: ICD-10-CM

## 2025-09-04 DIAGNOSIS — R06.00 DYSPNEA, UNSPECIFIED: ICD-10-CM

## 2025-09-04 DIAGNOSIS — L03.115 CELLULITIS OF RIGHT LOWER LIMB: ICD-10-CM

## 2025-09-04 DIAGNOSIS — R04.0 EPISTAXIS: ICD-10-CM

## 2025-09-04 DIAGNOSIS — R23.3 SPONTANEOUS ECCHYMOSES: ICD-10-CM

## 2025-09-04 DIAGNOSIS — R10.30 LOWER ABDOMINAL PAIN, UNSPECIFIED: ICD-10-CM

## 2025-09-04 PROCEDURE — 99214 OFFICE O/P EST MOD 30 MIN: CPT

## 2025-09-04 PROCEDURE — G2211 COMPLEX E/M VISIT ADD ON: CPT

## 2025-09-05 PROBLEM — L84 CALLUS OF FOOT: Status: RESOLVED | Noted: 2025-06-04 | Resolved: 2025-09-05

## 2025-09-05 PROBLEM — R04.0 EPISTAXIS: Status: ACTIVE | Noted: 2025-09-04

## 2025-09-05 PROBLEM — R06.00 DYSPNEA: Status: ACTIVE | Noted: 2025-09-04

## 2025-09-05 PROBLEM — R23.3 EASY BRUISING: Status: ACTIVE | Noted: 2025-09-04

## 2025-09-05 PROBLEM — Z01.419 ENCOUNTER FOR GYNECOLOGICAL EXAMINATION: Status: RESOLVED | Noted: 2021-02-26 | Resolved: 2025-09-05

## 2025-09-05 PROBLEM — R10.2 PELVIC PAIN: Status: RESOLVED | Noted: 2024-01-25 | Resolved: 2025-09-05

## 2025-09-05 PROBLEM — Z85.850 HISTORY OF MALIGNANT NEOPLASM OF THYROID: Status: RESOLVED | Noted: 2022-11-11 | Resolved: 2025-09-05

## 2025-09-05 PROBLEM — R10.30 LOWER ABDOMINAL PAIN OF UNKNOWN ETIOLOGY: Status: RESOLVED | Noted: 2024-02-07 | Resolved: 2025-09-05

## 2025-09-05 PROBLEM — R74.8 ELEVATED LIVER ENZYMES: Status: RESOLVED | Noted: 2024-02-08 | Resolved: 2025-09-05

## 2025-09-05 PROBLEM — Z12.31 OTHER SCREENING MAMMOGRAM: Status: RESOLVED | Noted: 2022-03-25 | Resolved: 2025-09-05

## 2025-09-05 PROBLEM — R92.30 DENSE BREAST: Status: RESOLVED | Noted: 2019-03-12 | Resolved: 2025-09-05

## 2025-09-08 ENCOUNTER — RESULT REVIEW (OUTPATIENT)
Age: 89
End: 2025-09-08

## 2025-09-09 DIAGNOSIS — R91.1 SOLITARY PULMONARY NODULE: ICD-10-CM

## 2025-09-17 ENCOUNTER — RESULT REVIEW (OUTPATIENT)
Age: 89
End: 2025-09-17